# Patient Record
Sex: FEMALE | Race: WHITE | Employment: FULL TIME | URBAN - METROPOLITAN AREA
[De-identification: names, ages, dates, MRNs, and addresses within clinical notes are randomized per-mention and may not be internally consistent; named-entity substitution may affect disease eponyms.]

---

## 2017-01-17 PROBLEM — M79.7 FIBROMYALGIA MUSCLE PAIN: Status: ACTIVE | Noted: 2017-01-17

## 2017-01-17 PROBLEM — E78.5 HYPERLIPIDEMIA: Status: ACTIVE | Noted: 2017-01-17

## 2017-01-24 ENCOUNTER — HOSPITAL ENCOUNTER (OUTPATIENT)
Dept: MRI IMAGING | Age: 47
Discharge: HOME | End: 2017-01-24
Attending: PODIATRIST
Payer: COMMERCIAL

## 2017-01-24 DIAGNOSIS — M25.571 RIGHT ANKLE PAIN: ICD-10-CM

## 2017-01-24 PROCEDURE — 73721 MRI JNT OF LWR EXTRE W/O DYE: CPT

## 2017-02-15 ENCOUNTER — HOSPITAL ENCOUNTER (OUTPATIENT)
Dept: PHYSICAL THERAPY | Age: 47
Discharge: HOME | End: 2017-02-15
Payer: COMMERCIAL

## 2017-02-15 PROCEDURE — 97140 MANUAL THERAPY 1/> REGIONS: CPT

## 2017-02-15 PROCEDURE — 97110 THERAPEUTIC EXERCISES: CPT

## 2017-02-15 PROCEDURE — 97162 PT EVAL MOD COMPLEX 30 MIN: CPT

## 2017-02-15 NOTE — PROGRESS NOTES
Ambulatory/Rehab Services H2 Model Falls Risk Assessment    Risk Factor Pts. ·   Confusion/Disorientation/Impulsivity  []    4 ·   Symptomatic Depression  []   2 ·   Altered Elimination  []   1 ·   Dizziness/Vertigo  []   1 ·   Gender (Male)  []   1 ·   Any administered antiepileptics (anticonvulsants):  []   2 ·   Any administered benzodiazepines:  []   1 ·   Visual Impairment (specify):  []   1 ·   Portable Oxygen Use  []   1 ·   Orthostatic ? BP  []   1 ·   History of Recent Falls (within 3 mos.)  []   5     Ability to Rise from Chair (choose one) Pts. ·   Ability to rise in a single movement  []   0 ·   Pushes up, successful in one attempt  [x]   1 ·   Multiple attempts, but successful  []   3 ·   Unable to rise without assistance  []   4   Total: (5 or greater = High Risk) 1     Falls Prevention Plan:   []                Physical Limitations to Exercise (specify):   []                Mobility Assistance Device (type):   []                Exercise/Equipment Adaptation (specify):    ©2010 [facility] of [address] Patent #[id number].  Federal Law prohibits the replication, distribution or use without written permission from [facility] "[employer]"

## 2017-02-15 NOTE — PROGRESS NOTES
Kerry Kendall  : 1970 [facility] at [address], [address] [place] [address]  [phone]   [phone]         OUTPATIENT PHYSICAL THERAPY:Initial Assessment 2/15/2017    ICD-10: Treatment Diagnosis: M25.571 pain in joint/ankle /joints of foot and R26.89 other abnormalities of gait and mobility  Precautions/Allergies:   Iodine-excessive weight bearing   Fall Risk Score: 1 (? 5 = High Risk)  MD Orders: evaluate and treat MEDICAL/REFERRING DIAGNOSIS:  Right ankle pain [M25.571]   DATE OF ONSET: 11-10-16  REFERRING PHYSICIAN: Raymond Ramirez DPM  RETURN PHYSICIAN APPOINTMENT: 17     INITIAL ASSESSMENT:  Ms. 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 Decreased Strength  2. Decreased ADL/Functional Activities  3. Decreased Transfer Abilities  4. Decreased Ambulation Ability/Technique  5. Decreased Balance  6. Increased Pain  7. Decreased Activity Tolerance  8. Decreased Pacing Skills INTERVENTIONS PLANNED:  1. Cryotherapy  2.  Gait Training  3. Heat  4. Home Exercise Program (HEP)  5. Manual Therapy  6. Therapeutic Exercise/Strengthening   7. vasopneumatic compression with cold   TREATMENT PLAN:  Effective Dates: 2-15-17 to 3-30-17. Frequency/Duration: 2 times a week for 6 weeks  GOALS: (Goals have been discussed and agreed upon with patient.)  Short-Term Functional Goals: Time Frame: 3-1-17  1. Lateral R ankle pain is less than 2/10 at rest and less than 7/10 after working all day to progress to DC goal   Instruction in exercise program to allow increased ADLs. Discharge Goals: Time Frame:3-30-17  1. No ankle pain at rest and 2-3/10 after working/standing for 8 hours to allow her to perform full home ADLs and most job duties   2. Full /painfree ankle mobility to allow ease with putting on shoes and socks and in and out of car and driving/hitting gas and brake pedal without any problem/pain  3. Independent ambulation with no antalgic gait to allow walking community distances  4. Able to go up and down curbs with only minimal issue   5. FAAM score is improved from 34/116 to 55/116  Rehabilitation Potential For Stated Goals: Good  Regarding Reji Ziegler's therapy, I certify that the treatment plan above will be carried out by a therapist or under their direction. Thank you for this referral,  Anurag Tobin PT     Referring Physician Signature: Wilma Chakraborty DPM              Date                    The information in this section was collected on 2-15-17 (except where otherwise noted). HISTORY:   History of Present Injury/Illness (Reason for Referral):  R inversion ankle sprain on 11-10-16 while jumping/playing with granddaughter  Past Medical History/Comorbidities:   Ms. 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   Ms. Samuel Fuentes  has a past surgical history that includes abdomen surgery proc unlisted; partial hysterectomy (2007); and total abdominal hysterectomy. Social History/Living Environment:   Home Environment: Private residence  # Steps to Enter: 0  Rails to Enter: Yes  Wheelchair Ramp: Yes  One/Two Story Residence: One story  Living Alone: No  Support Systems: Family member(s)  Patient Expects to be Discharged to[de-identified] Private residence  Current DME Used/Available at Home: Brace/Splint  Tub or Shower Type: Tub/Shower combination  Prior Level of Function/Work/Activity:  Independent with home ADLs and job duties and walking with no antalgic gait     Current Medications:       Current Outpatient Prescriptions:     Naproxen Sodium 500 mg TM24, 500 mg daily. , Disp: , Rfl:     ibuprofen 200 mg cap, 200 mg. 2 oral every 6 hours, Disp: , Rfl:     traMADol (ULTRAM) 50 mg tablet, Take 1 Tab by mouth every six (6) hours as needed for Pain., Disp: 20 Tab, Rfl: 0   Date Last Reviewed: 2-15-17   Number of Personal Factors/Comorbidities that affect the Plan of Care-patient has a history of lumbar/arthritis issues and fibromyalgia and is raising her grandchild alone and may have difficulty finding time to come to therapy  : 1-2: MODERATE COMPLEXITY   EXAMINATION:   Palpation:          Tender at R anterior talo fibular ligament and distal peroneal tendon with tight gastrosoleus  ROM:         Ankle range is wfl but guarded on the R -no swelling noted  Strength:          wfl except R ankle evertors are 3 to 3+/5 but with pain inhibition  Special Tests:         Negative homans sign -good pedal pulses-skin is warm not hot   Balance: Moderate antalgic gait with walking independently--attempted walking with cane but patient will need practice with this   Mental Status:         Alert and oriented and patient wants to take some time off from work to get this ankle well   Sensation:       Intact to light touch    Body Structures Involved:  1. Bones  2. Joints  3. Muscles  4.  Ligaments Body Functions Affected:  1. Sensory/Pain  2. Neuromusculoskeletal  3. Movement Related Activities and Participation Affected:  1. Learning and Applying Knowledge  2. General Tasks and Demands  3. Mobility  4. Self Care  5. Community, Social and [place]   Number of elements (examined above) that affect the Plan of Care: 3: MODERATE COMPLEXITY   CLINICAL PRESENTATION:   Presentation: Evolving clinical presentation with changing clinical characteristics: MODERATE COMPLEXITY   CLINICAL DECISION MAKING:   Outcome Measure: Tool Used: PT/OT FOOT AND ANKLE ABILITY MEASURE  Score:  Initial: 34/116 Most Recent: X (Date: -- )   Interpretation of Score: For the \"Activities of Daily Living\", there are 21 questions each scored on a 5 point scale with 0 representing \"Unable to do\" and 4 representing \"No difficulty\". The lower the score, the greater the functional disability. 84/84 represents no disability. Minimal detectable change is 5.7 points. With the addition of the 8 questions in the \"Sports Subscale,\" there are 29 questions, each scored on a 5 point scale with 0 representing \"Unable to do\" and 4 representing \"No difficulty\". The lower the score, the greater the functional disability. 116/116 represents no disability. Minimal detectable change is 12.3 points. Activities of Daily Living:  Score 84 83-68 67-51 50-34 33-18 17-1 0   Modifier CH CI CJ CK CL CM CN     Activities of Daily Living + Sports Subscale:  Score 116 115-94 93-71 70-47 46-24 23-1 0   Modifier CH CI CJ CK CL CM CN         Medical Necessity:   · Patient is expected to demonstrate progress in strength, range of motion, balance, coordination and functional technique to increase independence with ADLs and improve safety during walking and transfers. · Skilled intervention continues to be required due to R ankle pain/sprain is affecting function and gait.   Reason for Services/Other Comments:  · Patient continues to require modification of therapeutic interventions to increase complexity of exercises. Use of outcome tool(s) and clinical judgement create a POC that gives a: Questionable prediction of patient's progress: MODERATE COMPLEXITY            TREATMENT:   (In addition to Assessment/Re-Assessment sessions the following treatments were rendered)  Pre-treatment Symptoms/Complaints:  I have a problem taking antiinflammatories as they upset my stomach and I have not been taking my tramadol nor using ice much at all , just using my ankle brace but that feels loose on my ankle now  -I am thinking about taking some time off from work to get this ankle better as my job makes my pain worse at the end of each day -advised to use ice and elevate ankle and to push evertor strengthening   Pain: Initial:   Pain Intensity 1: 2  Pain Location 1: Ankle  Pain Orientation 1: Lateral, Right  Pain Intervention(s) 1: Elevation, Ice, Rest  Post Session:  1/10 but still walking with moderate antalgic gait -performed exercises well and tolerated cryotherapy well -needs work with gait training with cane      THERAPEUTIC EXERCISE: (15 minutes): Instructed in use of ice and elevation and exercises and ankle sleeve/support and use of cane     Exercises per grid below to improve mobility, strength, balance and coordination. Required minimal verbal cues to promote proper body alignment and promote proper body posture. Progressed resistance, range, repetitions and complexity of movement as indicated.    Date:  2-15-17 Date:   Date:     Activity/Exercise Parameters Parameters Parameters   gastrocsoleus stretch  X 5 minutes by therapist -done between each set of evertor work     Ankle eversion 5 x 15 with red band                                      Manual therapy -soft tissue mobilization x 15 minutes to lateral R ankle and peroneal musculature and plantar fascial area while supine -cross friction as tolerated     vasopneumatic compression with cold x 15 minutes while supine following exercises     NO CHARGE FOR VASOPNEUMATIC COMPRESSION     Treatment/Session Assessment:    · Response to Treatment:  Performed exercises well -tolerated cryotherapy well -needed cues with exercises and gait training with cane. · Compliance with Program/Exercises: compliant most of the time. · Recommendations/Intent for next treatment session: \"Next visit will focus on advancements to more challenging activities\". push gastrocsoleus stretching and ankle evertor conditioning followed with ice -ankle support while up on job/hard floors-if patient is going to take time off from work she needs to use it to get better and not catch up with household chores and being up all the time caring for her granddaughter  ·   Total Treatment Duration:manual therapy x 15 minutes/exercise x 15 minutes/vasopneumatic compression x 15 minutes -total treatment time was 45 minutes    Time in - 9:45  Time out-10:50       Faisal Kelly, PT

## 2017-02-20 ENCOUNTER — HOSPITAL ENCOUNTER (OUTPATIENT)
Dept: PHYSICAL THERAPY | Age: 47
Discharge: HOME | End: 2017-02-20
Payer: COMMERCIAL

## 2017-02-20 PROCEDURE — 97110 THERAPEUTIC EXERCISES: CPT

## 2017-02-20 PROCEDURE — 97140 MANUAL THERAPY 1/> REGIONS: CPT

## 2017-02-20 NOTE — PROGRESS NOTES
Maribell Hsu  : 1970 [facility] at [facility], Jeremiah adams, [address]  [phone]   [phone]         OUTPATIENT PHYSICAL THERAPY:Daily Note 2017    ICD-10: Treatment Diagnosis: M25.571 pain in joint/ankle /joints of foot and R26.89 other abnormalities of gait and mobility  Precautions/Allergies:   Iodine-excessive weight bearing   Fall Risk Score: 1 (? 5 = High Risk)  MD Orders: evaluate and treat MEDICAL/REFERRING DIAGNOSIS:  Right ankle pain [M25.571]   DATE OF ONSET: 11-10-16  REFERRING PHYSICIAN: Rafael Ramos DPM  RETURN PHYSICIAN APPOINTMENT: 17     INITIAL ASSESSMENT:  Ms. 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 Decreased Strength  2. Decreased ADL/Functional Activities  3. Decreased Transfer Abilities  4. Decreased Ambulation Ability/Technique  5. Decreased Balance  6. Increased Pain  7. Decreased Activity Tolerance  8. Decreased Pacing Skills INTERVENTIONS PLANNED:  1. Cryotherapy  2.  Gait Training  3. Heat  4. Home Exercise Program (HEP)  5. Manual Therapy  6. Therapeutic Exercise/Strengthening   7. vasopneumatic compression with cold   TREATMENT PLAN:  Effective Dates: 2-15-17 to 3-30-17. Frequency/Duration: 2 times a week for 6 weeks  GOALS: (Goals have been discussed and agreed upon with patient.)  Short-Term Functional Goals: Time Frame: 3-1-17  1. Lateral R ankle pain is less than 2/10 at rest and less than 7/10 after working all day to progress to DC goal   Instruction in exercise program to allow increased ADLs. Discharge Goals: Time Frame:3-30-17  1. No ankle pain at rest and 2-3/10 after working/standing for 8 hours to allow her to perform full home ADLs and most job duties   2. Full /painfree ankle mobility to allow ease with putting on shoes and socks and in and out of car and driving/hitting gas and brake pedal without any problem/pain  3. Independent ambulation with no antalgic gait to allow walking community distances  4. Able to go up and down curbs with only minimal issue   5. FAAM score is improved from 34/116 to 55/116  Rehabilitation Potential For Stated Goals: Good  Regarding Sunny Ziegler's therapy, I certify that the treatment plan above will be carried out by a therapist or under their direction. Thank you for this referral,  Natan Smith PTA     Referring Physician Signature: Kvng Chakraborty DPM              Date                    The information in this section was collected on 2-15-17 (except where otherwise noted). HISTORY:   History of Present Injury/Illness (Reason for Referral):  R inversion ankle sprain on 11-10-16 while jumping/playing with granddaughter  Past Medical History/Comorbidities:   Ms. 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   Ms. George Washington  has a past surgical history that includes abdomen surgery proc unlisted; partial hysterectomy (2007); and total abdominal hysterectomy. Social History/Living Environment:      Prior Level of Function/Work/Activity:  Independent with home ADLs and job duties and walking with no antalgic gait     Current Medications:       Current Outpatient Prescriptions:     Naproxen Sodium 500 mg TM24, 500 mg daily. , Disp: , Rfl:     ibuprofen 200 mg cap, 200 mg. 2 oral every 6 hours, Disp: , Rfl:     traMADol (ULTRAM) 50 mg tablet, Take 1 Tab by mouth every six (6) hours as needed for Pain., Disp: 20 Tab, Rfl: 0   Date Last Reviewed: 2-15-17   Number of Personal Factors/Comorbidities that affect the Plan of Care-patient has a history of lumbar/arthritis issues and fibromyalgia and is raising her grandchild alone and may have difficulty finding time to come to therapy  : 1-2: MODERATE COMPLEXITY   EXAMINATION:   Palpation:          Tender at R anterior talo fibular ligament and distal peroneal tendon with tight gastrosoleus  ROM:         Ankle range is wfl but guarded on the R -no swelling noted  Strength:          wfl except R ankle evertors are 3 to 3+/5 but with pain inhibition  Special Tests:         Negative homans sign -good pedal pulses-skin is warm not hot   Balance: Moderate antalgic gait with walking independently--attempted walking with cane but patient will need practice with this   Mental Status:         Alert and oriented and patient wants to take some time off from work to get this ankle well   Sensation:       Intact to light touch    Body Structures Involved:  1. Bones  2. Joints  3. Muscles  4. Ligaments Body Functions Affected:  1. Sensory/Pain  2. Neuromusculoskeletal  3. Movement Related Activities and Participation Affected:  1. Learning and Applying Knowledge  2. General Tasks and Demands  3. Mobility  4. Self Care  5.  Community, Social and [place]   Number of elements (examined above) that affect the Plan of Care: 3: MODERATE COMPLEXITY CLINICAL PRESENTATION:   Presentation: Evolving clinical presentation with changing clinical characteristics: MODERATE COMPLEXITY   CLINICAL DECISION MAKING:   Outcome Measure: Tool Used: PT/OT FOOT AND ANKLE ABILITY MEASURE  Score:  Initial: 34/116 Most Recent: X (Date: -- )   Interpretation of Score: For the \"Activities of Daily Living\", there are 21 questions each scored on a 5 point scale with 0 representing \"Unable to do\" and 4 representing \"No difficulty\". The lower the score, the greater the functional disability. 84/84 represents no disability. Minimal detectable change is 5.7 points. With the addition of the 8 questions in the \"Sports Subscale,\" there are 29 questions, each scored on a 5 point scale with 0 representing \"Unable to do\" and 4 representing \"No difficulty\". The lower the score, the greater the functional disability. 116/116 represents no disability. Minimal detectable change is 12.3 points. Activities of Daily Living:  Score 84 83-68 67-51 50-34 33-18 17-1 0   Modifier CH CI CJ CK CL CM CN     Activities of Daily Living + Sports Subscale:  Score 116 115-94 93-71 70-47 46-24 23-1 0   Modifier CH CI CJ CK CL CM CN         Medical Necessity:   · Patient is expected to demonstrate progress in strength, range of motion, balance, coordination and functional technique to increase independence with ADLs and improve safety during walking and transfers. · Skilled intervention continues to be required due to R ankle pain/sprain is affecting function and gait. Reason for Services/Other Comments:  · Patient continues to require modification of therapeutic interventions to increase complexity of exercises.    Use of outcome tool(s) and clinical judgement create a POC that gives a: Questionable prediction of patient's progress: MODERATE COMPLEXITY            TREATMENT:   (In addition to Assessment/Re-Assessment sessions the following treatments were rendered)  Pre-treatment Symptoms/Complaints: Tenderness at right lateral ankle and increased pain with prolonged standing and increased activities. Pain: Initial:   Pain Intensity 1: 2  Pain Location 1: Ankle  Pain Orientation 1: Lateral, Right  Post Session:  1/10 decreased antalgic gait after session     THERAPEUTIC EXERCISE: (25 minutes): Instructed in use of ice and elevation and exercises and ankle sleeve/support and use of cane     Exercises per grid below to improve mobility, strength, balance and coordination. Required minimal verbal cues to promote proper body alignment and promote proper body posture. Progressed resistance, range, repetitions and complexity of movement as indicated. Date:  2-15-17 Date:  2-20-17 Date:     Activity/Exercise Parameters Parameters Parameters   gastrocsoleus stretch  X 5 minutes by therapist -done between each set of evertor work Strap x 5    Ankle eversion 5 x 15 with red band  Green band 3 x 10    Ankle inversion  Green band 3 x 10    dorsiflexion  Green band 3 x 10    Plantar flexion  Green band 3 x 10    Mini squats  X 5 10 sec hold    Weight shifts  X 5 10 sec hold                        Manual therapy -soft tissue mobilization x 15 minutes to lateral R ankle , peroneal musculature and plantar fascia area while supine -         Treatment/Session Assessment:    · Response to Treatment: decreased pain after session. · Compliance with Program/Exercises: compliant most of the time. · Recommendations/Intent for next treatment session: \"Next visit will focus on advancements to more challenging activities\". push gastrocsoleus stretching and ankle evertor conditioning followed with ice -ankle support while up on job/hard floors-if patient is going to take time off from work she needs to use it to get better and not catch up with household chores and being up all the time caring for her granddaughter  ·   Total Treatment Duration:50 minutes      PT Patient Time In/Time Out  Time In: 0955  Time Out: [address] [facility] Jsamin Montez, PTA

## 2017-02-24 ENCOUNTER — HOSPITAL ENCOUNTER (OUTPATIENT)
Dept: PHYSICAL THERAPY | Age: 47
Discharge: HOME | End: 2017-02-24
Payer: COMMERCIAL

## 2017-02-24 PROCEDURE — 97140 MANUAL THERAPY 1/> REGIONS: CPT

## 2017-02-24 PROCEDURE — 97110 THERAPEUTIC EXERCISES: CPT

## 2017-02-24 NOTE — PROGRESS NOTES
Bay Kim  : 1970 [facility] [address], [address] [place] [address]  [phone]   [phone]         OUTPATIENT PHYSICAL THERAPY:Daily Note 2017    ICD-10: Treatment Diagnosis: M25.571 pain in joint/ankle /joints of foot and R26.89 other abnormalities of gait and mobility  Precautions/Allergies:   Iodine-excessive weight bearing   Fall Risk Score: 1 (? 5 = High Risk)  MD Orders: evaluate and treat MEDICAL/REFERRING DIAGNOSIS:  Right ankle pain [M25.571]   DATE OF ONSET: 11-10-16  REFERRING PHYSICIAN: Francetta Cowden, DPM  RETURN PHYSICIAN APPOINTMENT: 17     INITIAL ASSESSMENT:  Ms. 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 Decreased Strength  2. Decreased ADL/Functional Activities  3. Decreased Transfer Abilities  4. Decreased Ambulation Ability/Technique  5. Decreased Balance  6. Increased Pain  7. Decreased Activity Tolerance  8. Decreased Pacing Skills INTERVENTIONS PLANNED:  1. Cryotherapy  2.  Gait Training  3. Heat  4. Home Exercise Program (HEP)  5. Manual Therapy  6. Therapeutic Exercise/Strengthening   7. vasopneumatic compression with cold   TREATMENT PLAN:  Effective Dates: 2-15-17 to 3-30-17. Frequency/Duration: 2 times a week for 6 weeks  GOALS: (Goals have been discussed and agreed upon with patient.)  Short-Term Functional Goals: Time Frame: 3-1-17  1. Lateral R ankle pain is less than 2/10 at rest and less than 7/10 after working all day to progress to DC goal   Instruction in exercise program to allow increased ADLs. Discharge Goals: Time Frame:3-30-17  1. No ankle pain at rest and 2-3/10 after working/standing for 8 hours to allow her to perform full home ADLs and most job duties   2. Full /painfree ankle mobility to allow ease with putting on shoes and socks and in and out of car and driving/hitting gas and brake pedal without any problem/pain  3. Independent ambulation with no antalgic gait to allow walking community distances  4. Able to go up and down curbs with only minimal issue   5. FAAM score is improved from 34/116 to 55/116  Rehabilitation Potential For Stated Goals: Good  Regarding Elliott Ziegler's therapy, I certify that the treatment plan above will be carried out by a therapist or under their direction. Thank you for this referral,  Luana Luong PTA     Referring Physician Signature: Navya Chakraborty DPM              Date                    The information in this section was collected on 2-15-17 (except where otherwise noted). HISTORY:   History of Present Injury/Illness (Reason for Referral):  R inversion ankle sprain on 11-10-16 while jumping/playing with granddaughter  Past Medical History/Comorbidities:   Ms. 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   Ms. Cornell Jarrell  has a past surgical history that includes abdomen surgery proc unlisted; partial hysterectomy (2007); and total abdominal hysterectomy. Social History/Living Environment:      Prior Level of Function/Work/Activity:  Independent with home ADLs and job duties and walking with no antalgic gait     Current Medications:       Current Outpatient Prescriptions:     Naproxen Sodium 500 mg TM24, 500 mg daily. , Disp: , Rfl:     ibuprofen 200 mg cap, 200 mg. 2 oral every 6 hours, Disp: , Rfl:     traMADol (ULTRAM) 50 mg tablet, Take 1 Tab by mouth every six (6) hours as needed for Pain., Disp: 20 Tab, Rfl: 0   Date Last Reviewed: 2-24-17   Number of Personal Factors/Comorbidities that affect the Plan of Care-patient has a history of lumbar/arthritis issues and fibromyalgia and is raising her grandchild alone and may have difficulty finding time to come to therapy  : 1-2: MODERATE COMPLEXITY   EXAMINATION:   Palpation:          Tender at R anterior talo fibular ligament and distal peroneal tendon with tight gastrosoleus  ROM:         Ankle range is wfl but guarded on the R -no swelling noted  Strength:          wfl except R ankle evertors are 3 to 3+/5 but with pain inhibition  Special Tests:         Negative homans sign -good pedal pulses-skin is warm not hot   Balance: Moderate antalgic gait with walking independently--attempted walking with cane but patient will need practice with this   Mental Status:         Alert and oriented and patient wants to take some time off from work to get this ankle well   Sensation:       Intact to light touch    Body Structures Involved:  1. Bones  2. Joints  3. Muscles  4. Ligaments Body Functions Affected:  1. Sensory/Pain  2. Neuromusculoskeletal  3. Movement Related Activities and Participation Affected:  1. Learning and Applying Knowledge  2. General Tasks and Demands  3. Mobility  4. Self Care  5.  Community, Social and [place]   Number of elements (examined above) that affect the Plan of Care: 3: MODERATE COMPLEXITY CLINICAL PRESENTATION:   Presentation: Evolving clinical presentation with changing clinical characteristics: MODERATE COMPLEXITY   CLINICAL DECISION MAKING:   Outcome Measure: Tool Used: PT/OT FOOT AND ANKLE ABILITY MEASURE  Score:  Initial: 34/116 Most Recent: X (Date: -- )   Interpretation of Score: For the \"Activities of Daily Living\", there are 21 questions each scored on a 5 point scale with 0 representing \"Unable to do\" and 4 representing \"No difficulty\". The lower the score, the greater the functional disability. 84/84 represents no disability. Minimal detectable change is 5.7 points. With the addition of the 8 questions in the \"Sports Subscale,\" there are 29 questions, each scored on a 5 point scale with 0 representing \"Unable to do\" and 4 representing \"No difficulty\". The lower the score, the greater the functional disability. 116/116 represents no disability. Minimal detectable change is 12.3 points. Activities of Daily Living:  Score 84 83-68 67-51 50-34 33-18 17-1 0   Modifier CH CI CJ CK CL CM CN     Activities of Daily Living + Sports Subscale:  Score 116 115-94 93-71 70-47 46-24 23-1 0   Modifier CH CI CJ CK CL CM CN         Medical Necessity:   · Patient is expected to demonstrate progress in strength, range of motion, balance, coordination and functional technique to increase independence with ADLs and improve safety during walking and transfers. · Skilled intervention continues to be required due to R ankle pain/sprain is affecting function and gait. Reason for Services/Other Comments:  · Patient continues to require modification of therapeutic interventions to increase complexity of exercises.    Use of outcome tool(s) and clinical judgement create a POC that gives a: Questionable prediction of patient's progress: MODERATE COMPLEXITY            TREATMENT:   (In addition to Assessment/Re-Assessment sessions the following treatments were rendered)  Pre-treatment Symptoms/Complaints: Reports over all improvement since injury but continues to report increased right ankle pain with prolonged standing and increased activities including being on her feet a lot at work. Pain: Initial:   Pain Intensity 1: 2  Pain Location 1: Ankle  Pain Orientation 1: Right, Lateral  Post Session:  1/10 decreased antalgic gait after session     THERAPEUTIC EXERCISE: (25 minutes): Instructed in use of ice and elevation and exercises and ankle sleeve/support and use of cane     Exercises per grid below to improve mobility, strength, balance and coordination. Required minimal verbal cues to promote proper body alignment and promote proper body posture. Progressed resistance, range, repetitions and complexity of movement as indicated. Date:  2-15-17 Date:  2-20-17 Date:  2-24-17   Activity/Exercise Parameters Parameters Parameters   gastrocsoleus stretch  X 5 minutes by therapist -done between each set of evertor work Strap x 5 Incline 3 x 30 sec   Ankle eversion 5 x 15 with red band  Green band 3 x 10 Green band 2 x 15   Ankle inversion  Green band 3 x 10 Green band 2 x 15   dorsiflexion  Green band 3 x 10 Green band 2 x 15   Plantar flexion  Green band 3 x 10 Green band 1 x 15   Mini squats  X 5 10 sec hold    Weight shifts  X 5 10 sec hold    NU step   5 minutes level 3   Single leg stance   6 x 10 sec   Heel raises   3 x 10                 Manual therapy -soft tissue mobilization x 15 minutes to lateral R ankle , peroneal musculature and plantar fascia area while supine -         Treatment/Session Assessment:    · Response to Treatment: decreased pain after session. Improved gait after treatment  · Compliance with Program/Exercises: compliant most of the time. · Recommendations/Intent for next treatment session: \"Next visit will focus on advancements to more challenging activities\"progress weight bearing exercises as tolerated.  Modalities as needed  ·   Total Treatment Duration:40 minutes      PT Patient Time In/Time Out  Time In: 1440  Time Out: 1530    Leonor Found, PTA

## 2017-02-27 ENCOUNTER — HOSPITAL ENCOUNTER (OUTPATIENT)
Dept: PHYSICAL THERAPY | Age: 47
Discharge: HOME | End: 2017-02-27
Payer: COMMERCIAL

## 2017-02-27 PROCEDURE — 97110 THERAPEUTIC EXERCISES: CPT

## 2017-02-27 NOTE — PROGRESS NOTES
Danielle Rodriguez  : 1970 [facility] [place] [address], Jeremiah adams, [address] [facility]  [phone]   [phone]         OUTPATIENT PHYSICAL THERAPY:Daily Note 2017    ICD-10: Treatment Diagnosis: M25.571 pain in joint/ankle /joints of foot and R26.89 other abnormalities of gait and mobility  Precautions/Allergies:   Iodine-excessive weight bearing   Fall Risk Score: 1 (? 5 = High Risk)  MD Orders: evaluate and treat MEDICAL/REFERRING DIAGNOSIS:  Right ankle pain [M25.571]   DATE OF ONSET: 11-10-16  REFERRING PHYSICIAN: Cory Kimbrough DPM  RETURN PHYSICIAN APPOINTMENT: 17     INITIAL ASSESSMENT:  Ms. 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 Decreased Strength  2. Decreased ADL/Functional Activities  3. Decreased Transfer Abilities  4. Decreased Ambulation Ability/Technique  5. Decreased Balance  6. Increased Pain  7. Decreased Activity Tolerance  8. Decreased Pacing Skills INTERVENTIONS PLANNED:  1. Cryotherapy  2.  Gait Training  3. Heat  4. Home Exercise Program (HEP)  5. Manual Therapy  6. Therapeutic Exercise/Strengthening   7. vasopneumatic compression with cold   TREATMENT PLAN:  Effective Dates: 2-15-17 to 3-30-17. Frequency/Duration: 2 times a week for 6 weeks  GOALS: (Goals have been discussed and agreed upon with patient.)  Short-Term Functional Goals: Time Frame: 3-1-17  1. Lateral R ankle pain is less than 2/10 at rest and less than 7/10 after working all day to progress to DC goal   Instruction in exercise program to allow increased ADLs. Discharge Goals: Time Frame:3-30-17  1. No ankle pain at rest and 2-3/10 after working/standing for 8 hours to allow her to perform full home ADLs and most job duties   2. Full /painfree ankle mobility to allow ease with putting on shoes and socks and in and out of car and driving/hitting gas and brake pedal without any problem/pain  3. Independent ambulation with no antalgic gait to allow walking community distances  4. Able to go up and down curbs with only minimal issue   5. FAAM score is improved from 34/116 to 55/116  Rehabilitation Potential For Stated Goals: Good  Regarding Orthomasrenee Ziegler's therapy, I certify that the treatment plan above will be carried out by a therapist or under their direction. Thank you for this referral,  Ursula Cook PTA     Referring Physician Signature: James Chakraborty DPM              Date                    The information in this section was collected on 2-15-17 (except where otherwise noted). HISTORY:   History of Present Injury/Illness (Reason for Referral):  R inversion ankle sprain on 11-10-16 while jumping/playing with granddaughter  Past Medical History/Comorbidities:   Ms. 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   Ms. Phoebe Saldaña  has a past surgical history that includes abdomen surgery proc unlisted; partial hysterectomy (2007); and total abdominal hysterectomy. Social History/Living Environment:      Prior Level of Function/Work/Activity:  Independent with home ADLs and job duties and walking with no antalgic gait     Current Medications:       Current Outpatient Prescriptions:     Naproxen Sodium 500 mg TM24, 500 mg daily. , Disp: , Rfl:     ibuprofen 200 mg cap, 200 mg. 2 oral every 6 hours, Disp: , Rfl:     traMADol (ULTRAM) 50 mg tablet, Take 1 Tab by mouth every six (6) hours as needed for Pain., Disp: 20 Tab, Rfl: 0   Date Last Reviewed: 2-24-17   Number of Personal Factors/Comorbidities that affect the Plan of Care-patient has a history of lumbar/arthritis issues and fibromyalgia and is raising her grandchild alone and may have difficulty finding time to come to therapy  : 1-2: MODERATE COMPLEXITY   EXAMINATION:   Palpation:          Tender at R anterior talo fibular ligament and distal peroneal tendon with tight gastrosoleus  ROM:         Ankle range is wfl but guarded on the R -no swelling noted  Strength:          wfl except R ankle evertors are 3 to 3+/5 but with pain inhibition  Special Tests:         Negative homans sign -good pedal pulses-skin is warm not hot   Balance: Moderate antalgic gait with walking independently--attempted walking with cane but patient will need practice with this   Mental Status:         Alert and oriented and patient wants to take some time off from work to get this ankle well   Sensation:       Intact to light touch    Body Structures Involved:  1. Bones  2. Joints  3. Muscles  4. Ligaments Body Functions Affected:  1. Sensory/Pain  2. Neuromusculoskeletal  3. Movement Related Activities and Participation Affected:  1. Learning and Applying Knowledge  2. General Tasks and Demands  3. Mobility  4. Self Care  5.  Community, Social and [place]   Number of elements (examined above) that affect the Plan of Care: 3: MODERATE COMPLEXITY CLINICAL PRESENTATION:   Presentation: Evolving clinical presentation with changing clinical characteristics: MODERATE COMPLEXITY   CLINICAL DECISION MAKING:   Outcome Measure: Tool Used: PT/OT FOOT AND ANKLE ABILITY MEASURE  Score:  Initial: 34/116 Most Recent: X (Date: -- )   Interpretation of Score: For the \"Activities of Daily Living\", there are 21 questions each scored on a 5 point scale with 0 representing \"Unable to do\" and 4 representing \"No difficulty\". The lower the score, the greater the functional disability. 84/84 represents no disability. Minimal detectable change is 5.7 points. With the addition of the 8 questions in the \"Sports Subscale,\" there are 29 questions, each scored on a 5 point scale with 0 representing \"Unable to do\" and 4 representing \"No difficulty\". The lower the score, the greater the functional disability. 116/116 represents no disability. Minimal detectable change is 12.3 points. Activities of Daily Living:  Score 84 83-68 67-51 50-34 33-18 17-1 0   Modifier CH CI CJ CK CL CM CN     Activities of Daily Living + Sports Subscale:  Score 116 115-94 93-71 70-47 46-24 23-1 0   Modifier CH CI CJ CK CL CM CN         Medical Necessity:   · Patient is expected to demonstrate progress in strength, range of motion, balance, coordination and functional technique to increase independence with ADLs and improve safety during walking and transfers. · Skilled intervention continues to be required due to R ankle pain/sprain is affecting function and gait. Reason for Services/Other Comments:  · Patient continues to require modification of therapeutic interventions to increase complexity of exercises.    Use of outcome tool(s) and clinical judgement create a POC that gives a: Questionable prediction of patient's progress: MODERATE COMPLEXITY            TREATMENT:   (In addition to Assessment/Re-Assessment sessions the following treatments were rendered)  Pre-treatment Symptoms/Complaints: Minimal pain today. Pain increases with prolonged standing and increased walking. Pain: Initial:   Pain Intensity 1: 1  Pain Location 1: Ankle  Pain Orientation 1: Lateral, Right  Post Session:  1/10 decreased antalgic gait after session     THERAPEUTIC EXERCISE: (45 minutes): Instructed in use of ice and elevation and exercises and ankle sleeve/support and use of cane     Exercises per grid below to improve mobility, strength, balance and coordination. Required minimal verbal cues to promote proper body alignment and promote proper body posture. Progressed resistance, range, repetitions and complexity of movement as indicated. Date:  2-27-17 Date:  2-20-17 Date:  2-24-17   Activity/Exercise Parameters Parameters Parameters   gastrocsoleus stretch  Strap x 3  Incline 4 x 30 sec Strap x 5 Incline 3 x 30 sec   Ankle eversion Green 3 x 10 Green band 3 x 10 Green band 2 x 15   Ankle inversion Green 3 x 10 Green band 3 x 10 Green band 2 x 15   dorsiflexion Green 3 x 10 Green band 3 x 10 Green band 2 x 15   Plantar flexion Green 3 x 10 Green band 3 x 10 Green band 1 x 15   Mini squats  X 5 10 sec hold    Weight shifts  X 5 10 sec hold    NU step 6 minutes level 3  5 minutes level 3   Single leg stance 3 x 30 sec  6 x 10 sec   Heel raises 3 x 10  3 x 10   aridyne 5 minutes     elliptical 2 minutes forward 2 min backward                              Treatment/Session Assessment:    · Response to Treatment: decreased pain after session. Improved gait after treatment. Decreased antalgic gait  · Compliance with Program/Exercises: compliant most of the time. · Recommendations/Intent for next treatment session: \"Next visit will focus on advancements to more challenging activities\"progress weight bearing exercises as tolerated.  Modalities as needed  ·   Total Treatment Duration:45 minutes      PT Patient Time In/Time Out  Time In: 1000  Time Out: 440 [facility]

## 2017-03-01 ENCOUNTER — HOSPITAL ENCOUNTER (OUTPATIENT)
Dept: PHYSICAL THERAPY | Age: 47
Discharge: HOME | End: 2017-03-01
Payer: COMMERCIAL

## 2017-03-01 PROCEDURE — 97110 THERAPEUTIC EXERCISES: CPT

## 2017-03-01 NOTE — PROGRESS NOTES
Bay Kim  : 1970 [facility], [address], [address] [place] [address]  [phone]   [phone]         OUTPATIENT PHYSICAL THERAPY:Daily Note 3/1/2017    ICD-10: Treatment Diagnosis: M25.571 pain in joint/ankle /joints of foot and R26.89 other abnormalities of gait and mobility  Precautions/Allergies:   Iodine-excessive weight bearing   Fall Risk Score: 1 (? 5 = High Risk)  MD Orders: evaluate and treat MEDICAL/REFERRING DIAGNOSIS:  Right ankle pain [M25.571]   DATE OF ONSET: 11-10-16  REFERRING PHYSICIAN: Francetta Cowden, DPM  RETURN PHYSICIAN APPOINTMENT: 17     INITIAL ASSESSMENT:  Ms. 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 Decreased Strength  2. Decreased ADL/Functional Activities  3. Decreased Transfer Abilities  4. Decreased Ambulation Ability/Technique  5. Decreased Balance  6. Increased Pain  7. Decreased Activity Tolerance  8. Decreased Pacing Skills INTERVENTIONS PLANNED:  1. Cryotherapy  2.  Gait Training  3. Heat  4. Home Exercise Program (HEP)  5. Manual Therapy  6. Therapeutic Exercise/Strengthening   7. vasopneumatic compression with cold   TREATMENT PLAN:  Effective Dates: 2-15-17 to 3-30-17. Frequency/Duration: 2 times a week for 6 weeks  GOALS: (Goals have been discussed and agreed upon with patient.)  Short-Term Functional Goals: Time Frame: 3-1-17  1. Lateral R ankle pain is less than 2/10 at rest and less than 7/10 after working all day to progress to DC goal   Instruction in exercise program to allow increased ADLs. Discharge Goals: Time Frame:3-30-17  1. No ankle pain at rest and 2-3/10 after working/standing for 8 hours to allow her to perform full home ADLs and most job duties   2. Full /painfree ankle mobility to allow ease with putting on shoes and socks and in and out of car and driving/hitting gas and brake pedal without any problem/pain  3. Independent ambulation with no antalgic gait to allow walking community distances  4. Able to go up and down curbs with only minimal issue   5. FAAM score is improved from 34/116 to 55/116  Rehabilitation Potential For Stated Goals: Good  Regarding Huseyin Ziegler's therapy, I certify that the treatment plan above will be carried out by a therapist or under their direction. Thank you for this referral,  Vinson Snellen, PTA     Referring Physician Signature: Berto Chakraborty DPM              Date                    The information in this section was collected on 2-15-17 (except where otherwise noted). HISTORY:   History of Present Injury/Illness (Reason for Referral):  R inversion ankle sprain on 11-10-16 while jumping/playing with granddaughter  Past Medical History/Comorbidities:   Ms. 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   Ms. Princess Arias  has a past surgical history that includes abdomen surgery proc unlisted; partial hysterectomy (2007); and total abdominal hysterectomy. Social History/Living Environment:      Prior Level of Function/Work/Activity:  Independent with home ADLs and job duties and walking with no antalgic gait     Current Medications:       Current Outpatient Prescriptions:     Naproxen Sodium 500 mg TM24, 500 mg daily. , Disp: , Rfl:     ibuprofen 200 mg cap, 200 mg. 2 oral every 6 hours, Disp: , Rfl:     traMADol (ULTRAM) 50 mg tablet, Take 1 Tab by mouth every six (6) hours as needed for Pain., Disp: 20 Tab, Rfl: 0   Date Last Reviewed: 2-24-17   Number of Personal Factors/Comorbidities that affect the Plan of Care-patient has a history of lumbar/arthritis issues and fibromyalgia and is raising her grandchild alone and may have difficulty finding time to come to therapy  : 1-2: MODERATE COMPLEXITY   EXAMINATION:   Palpation:          Tender at R anterior talo fibular ligament and distal peroneal tendon with tight gastrosoleus  ROM:         Ankle range is wfl but guarded on the R -no swelling noted  Strength:          wfl except R ankle evertors are 3 to 3+/5 but with pain inhibition  Special Tests:         Negative homans sign -good pedal pulses-skin is warm not hot   Balance: Moderate antalgic gait with walking independently--attempted walking with cane but patient will need practice with this   Mental Status:         Alert and oriented and patient wants to take some time off from work to get this ankle well   Sensation:       Intact to light touch    Body Structures Involved:  1. Bones  2. Joints  3. Muscles  4. Ligaments Body Functions Affected:  1. Sensory/Pain  2. Neuromusculoskeletal  3. Movement Related Activities and Participation Affected:  1. Learning and Applying Knowledge  2. General Tasks and Demands  3. Mobility  4. Self Care  5.  Community, Social and [place]   Number of elements (examined above) that affect the Plan of Care: 3: MODERATE COMPLEXITY CLINICAL PRESENTATION:   Presentation: Evolving clinical presentation with changing clinical characteristics: MODERATE COMPLEXITY   CLINICAL DECISION MAKING:   Outcome Measure: Tool Used: PT/OT FOOT AND ANKLE ABILITY MEASURE  Score:  Initial: 34/116 Most Recent: X (Date: -- )   Interpretation of Score: For the \"Activities of Daily Living\", there are 21 questions each scored on a 5 point scale with 0 representing \"Unable to do\" and 4 representing \"No difficulty\". The lower the score, the greater the functional disability. 84/84 represents no disability. Minimal detectable change is 5.7 points. With the addition of the 8 questions in the \"Sports Subscale,\" there are 29 questions, each scored on a 5 point scale with 0 representing \"Unable to do\" and 4 representing \"No difficulty\". The lower the score, the greater the functional disability. 116/116 represents no disability. Minimal detectable change is 12.3 points. Activities of Daily Living:  Score 84 83-68 67-51 50-34 33-18 17-1 0   Modifier CH CI CJ CK CL CM CN     Activities of Daily Living + Sports Subscale:  Score 116 115-94 93-71 70-47 46-24 23-1 0   Modifier CH CI CJ CK CL CM CN         Medical Necessity:   · Patient is expected to demonstrate progress in strength, range of motion, balance, coordination and functional technique to increase independence with ADLs and improve safety during walking and transfers. · Skilled intervention continues to be required due to R ankle pain/sprain is affecting function and gait. Reason for Services/Other Comments:  · Patient continues to require modification of therapeutic interventions to increase complexity of exercises.    Use of outcome tool(s) and clinical judgement create a POC that gives a: Questionable prediction of patient's progress: MODERATE COMPLEXITY            TREATMENT:   (In addition to Assessment/Re-Assessment sessions the following treatments were rendered)  Pre-treatment Symptoms/Complaints: Minimal pain today. Pain increases with prolonged standing and increased walking. Pain: Initial:   Pain Intensity 1: 1  Pain Location 1: Ankle  Pain Orientation 1: Lateral, Right  Post Session:  1/10 decreased antalgic gait after session     THERAPEUTIC EXERCISE: (45 minutes): Instructed in use of ice and elevation and exercises and ankle sleeve/support and use of cane     Exercises per grid below to improve mobility, strength, balance and coordination. Required minimal verbal cues to promote proper body alignment and promote proper body posture. Progressed resistance, range, repetitions and complexity of movement as indicated. Date:  2-27-17 Date:  3-1-17 Date:  2-24-17   Activity/Exercise Parameters Parameters Parameters   gastrocsoleus stretch  Strap x 3  Incline 4 x 30 sec Incline 3 x 30 sec Incline 3 x 30 sec   Ankle eversion Green 3 x 10 Green band 3 x 10 Green band 2 x 15   Ankle inversion Green 3 x 10 Green band 3 x 10 Green band 2 x 15   dorsiflexion Green 3 x 10 Green band 3 x 10 Green band 2 x 15   Plantar flexion Green 3 x 10 Green band 3 x 10 Green band 1 x 15   Mini squats      Weight shifts      NU step 6 minutes level 3 10 minutes level 3 5 minutes level 3   Single leg stance 3 x 30 sec Green therapad 4 x 15 sec 6 x 10 sec   Heel raises 3 x 10 3 x 10 3 x 10   aridyne 5 minutes 7 minutes    elliptical 2 minutes forward 2 min backward 2 minutes forward  2 minutes back                             Treatment/Session Assessment:    · Response to Treatment: decreased pain after session. decreased antalgic gait. · Compliance with Program/Exercises: compliant most of the time. · Recommendations/Intent for next treatment session: \"Next visit will focus on advancements to more challenging activities\"progress weight bearing exercises as tolerated.  Modalities as needed  ·   Total Treatment Duration:45 minutes      PT Patient Time In/Time Out  Time In: 1000  Time Out: 24[facility]

## 2017-03-07 ENCOUNTER — HOSPITAL ENCOUNTER (OUTPATIENT)
Dept: PHYSICAL THERAPY | Age: 47
Discharge: HOME | End: 2017-03-07
Payer: COMMERCIAL

## 2017-03-07 PROCEDURE — 97110 THERAPEUTIC EXERCISES: CPT

## 2017-03-07 PROCEDURE — 97140 MANUAL THERAPY 1/> REGIONS: CPT

## 2017-03-07 NOTE — PROGRESS NOTES
Shazia Giorgi  : 1970 [facility] at [facility], Jeremiah adams, [address]  [phone]   [phone]         OUTPATIENT PHYSICAL THERAPY:Daily Note and Progress Report 3/7/2017    ICD-10: Treatment Diagnosis: M25.571 pain in joint/ankle /joints of foot and R26.89 other abnormalities of gait and mobility  Precautions/Allergies:   Iodine-excessive weight bearing   Fall Risk Score: 1 (? 5 = High Risk)  MD Orders: evaluate and treat MEDICAL/REFERRING DIAGNOSIS:  Right ankle pain [M25.571]   DATE OF ONSET: 11-10-16  REFERRING PHYSICIAN: Dyan Ochoa DPM  RETURN PHYSICIAN APPOINTMENT: 17     INITIAL ASSESSMENT:  Ms. 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 Decreased Strength  2. Decreased ADL/Functional Activities  3. Decreased Transfer Abilities  4. Decreased Ambulation Ability/Technique  5. Decreased Balance  6. Increased Pain  7. Decreased Activity Tolerance  8.  Decreased Pacing Skills INTERVENTIONS PLANNED:  1. Cryotherapy  2. Gait Training  3. Heat  4. Home Exercise Program (HEP)  5. Manual Therapy  6. Therapeutic Exercise/Strengthening   7. vasopneumatic compression with cold   TREATMENT PLAN:  Effective Dates: 2-15-17 to 3-30-17. Frequency/Duration: 2 times a week for 6 weeks  GOALS: (Goals have been discussed and agreed upon with patient.)  Short-Term Functional Goals: Time Frame: 3-1-17  1. Lateral R ankle pain is less than 2/10 at rest and less than 7/10 after working all day to progress to DC goal -GOAL MET  Instruction in exercise program to allow increased ADLs. -GOAL MET  Discharge Goals: Time Frame:3-30-17  1. No ankle pain at rest and 2-3/10 after working/standing for 8 hours to allow her to perform full home ADLs and most job duties -GOAL MET-PAIN LEVEL IS 0/10  2. Full /painfree ankle mobility to allow ease with putting on shoes and socks and in and out of car and driving/hitting gas and brake pedal without any problem/pain-GOAL MET  3. Independent ambulation with no antalgic gait to allow walking community distances-GOAL MET  4. Able to go up and down curbs with only minimal issue -ONGOING  5. FAAM score is improved from 34/116 to 55/116-GOAL MET-LATEST SCORE IS 87/116    Patient has been seen for 6 visits of R ankle rehab with good progress-pain level is as low as 0/10 with minimal antalgic gait -independent with home exercise program -minimal swelling in R lateral ankle at this time -FAAM score has improved from 34/116 to 87/116-motivated patient -pleasant lady to work with -continue current regimen at this time     Rehabilitation Potential For Stated Goals: Good  Regarding Guy Ziegler's therapy, I certify that the treatment plan above will be carried out by a therapist or under their direction.   Thank you for this referral,  Jensen Russ PT     Referring Physician Signature: Alec Chakraborty DPM              Date                    The information in this section was collected on 2-15-17 (except where otherwise noted). HISTORY:   History of Present Injury/Illness (Reason for Referral):  R inversion ankle sprain on 11-10-16 while jumping/playing with granddaughter  Past Medical History/Comorbidities:   Ms. 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 Ms. Bud Glasgow  has a past surgical history that includes abdomen surgery proc unlisted; partial hysterectomy (2007); and total abdominal hysterectomy. Social History/Living Environment:      Prior Level of Function/Work/Activity:  Independent with home ADLs and job duties and walking with no antalgic gait     Current Medications:       Current Outpatient Prescriptions:     Naproxen Sodium 500 mg TM24, 500 mg daily. , Disp: , Rfl:     ibuprofen 200 mg cap, 200 mg. 2 oral every 6 hours, Disp: , Rfl:     traMADol (ULTRAM) 50 mg tablet, Take 1 Tab by mouth every six (6) hours as needed for Pain., Disp: 20 Tab, Rfl: 0   Date Last Reviewed: 3-7-17   Number of Personal Factors/Comorbidities that affect the Plan of Care-patient has a history of lumbar/arthritis issues and fibromyalgia and is raising her grandchild alone and may have difficulty finding time to come to therapy  : 1-2: MODERATE COMPLEXITY   EXAMINATION:   Palpation:         Minimal tender at R anterior talo fibular ligament and distal peroneal tendon with tight gastrosoleus  ROM:         Ankle range is wfl but guarded on the R -    Circumference at malleoli is 9.0 on L and 9.15 on R -minimal swelling  Strength:          wfl except R ankle evertors are  3+ to 4-/5 but with minimal pain inhibition  Special Tests:         Negative homans sign -good pedal pulses-skin is warm not hot   Balance:          Moderate antalgic gait with walking independently--attempted walking with cane but patient will need practice with this   Mental Status: Alert and oriented and patient wants to take some time off from work to get this ankle well   Sensation:       Intact to light touch    Body Structures Involved:  1. Bones  2. Joints  3. Muscles  4. Ligaments Body Functions Affected:  1. Sensory/Pain  2. Neuromusculoskeletal  3. Movement Related Activities and Participation Affected:  1. Learning and Applying Knowledge  2. General Tasks and Demands  3. Mobility  4. Self Care  5. Community, Social and [place]   Number of elements (examined above) that affect the Plan of Care: 3: MODERATE COMPLEXITY   CLINICAL PRESENTATION:   Presentation: Evolving clinical presentation with changing clinical characteristics: MODERATE COMPLEXITY   CLINICAL DECISION MAKING:   Outcome Measure: Tool Used: PT/OT FOOT AND ANKLE ABILITY MEASURE  Score:  Initial: 34/116 Most Recent: 87/116 (Date: 3-7-17-- )   Interpretation of Score: For the \"Activities of Daily Living\", there are 21 questions each scored on a 5 point scale with 0 representing \"Unable to do\" and 4 representing \"No difficulty\". The lower the score, the greater the functional disability. 84/84 represents no disability. Minimal detectable change is 5.7 points. With the addition of the 8 questions in the \"Sports Subscale,\" there are 29 questions, each scored on a 5 point scale with 0 representing \"Unable to do\" and 4 representing \"No difficulty\". The lower the score, the greater the functional disability. 116/116 represents no disability. Minimal detectable change is 12.3 points.     Activities of Daily Living:  Score 84 83-68 67-51 50-34 33-18 17-1 0   Modifier CH CI CJ CK CL CM CN     Activities of Daily Living + Sports Subscale:  Score 116 115-94 93-71 70-47 46-24 23-1 0   Modifier CH CI CJ CK CL CM CN         Medical Necessity:   · Patient is expected to demonstrate progress in strength, range of motion, balance, coordination and functional technique to increase independence with ADLs and improve safety during walking and transfers. · Skilled intervention continues to be required due to R ankle pain/sprain is affecting function and gait. Reason for Services/Other Comments:  · Patient continues to require modification of therapeutic interventions to increase complexity of exercises. Use of outcome tool(s) and clinical judgement create a POC that gives a: Questionable prediction of patient's progress: MODERATE COMPLEXITY            TREATMENT:   (In addition to Assessment/Re-Assessment sessions the following treatments were rendered)  Pre-treatment Symptoms/Complaints:  Minimal pain today. Pain increases with prolonged standing and increased walking. Pain: Initial:   Pain Intensity 1: 0  Pain Location 1: Ankle  Pain Orientation 1: Lateral, Right  Post Session: 0/10-no  antalgic gait after session-full range of motion with minimal swelling      THERAPEUTIC EXERCISE: (28 minutes): Instructed in use of ice and elevation and exercises and ankle sleeve/support and use of cane     Exercises per grid below to improve mobility, strength, balance and coordination. Required minimal verbal cues to promote proper body alignment and promote proper body posture. Progressed resistance, range, repetitions and complexity of movement as indicated.    Date:  2-27-17 Date:  3-1-17 Date:  3-7-17   Activity/Exercise Parameters Parameters Parameters   gastrocsoleus stretch  Strap x 3  Incline 4 x 30 sec Incline 3 x 30 sec X 4 minutes-stretching between exercise reps    Ankle eversion Green 3 x 10 Green band 3 x 10 5 x 20 with green band   Ankle inversion Green 3 x 10 Green band 3 x 10 Green band 3 x 20    dorsiflexion Green 3 x 10 Green band 3 x 10 Green band 3 x 20    Plantar flexion Green 3 x 10 Green band 3 x 10 Green band 3 x 20    Mini squats      Weight shifts      NU step 6 minutes level 3 10 minutes level 3    Single leg stance 3 x 30 sec Green therapad 4 x 15 sec X 8 with 30 second hold  on blue therapad   Heel raises 3 x 10 3 x 10    aridyne 5 minutes 7 minutes    elliptical 2 minutes forward 2 min backward 2 minutes forward  2 minutes back                         MANUAL THERAPY - soft tissue mobilization to lateral ankle and peroneal musculature and gastrocsoleus x 12 minutes while supine     Cold pack x 8 minutes to R ankle following exercises while supine         Treatment/Session Assessment:    · Response to Treatment: decreased pain and swelling  after session with no antalgic gait -very good progress. · Compliance with Program/Exercises: compliant most of the time. · Recommendations/Intent for next treatment session: \"Next visit will focus on advancements to more challenging activities\"progress weight bearing exercises as tolerated. -push ankle conditioning/stabilization  ·   Total Treatment Duration:48 minutes      PT Patient Time In/Time Out  Time In: 1100  Time Out: 1125 [facility],

## 2017-03-09 ENCOUNTER — HOSPITAL ENCOUNTER (OUTPATIENT)
Dept: PHYSICAL THERAPY | Age: 47
Discharge: HOME | End: 2017-03-09
Payer: COMMERCIAL

## 2017-03-09 PROCEDURE — 97110 THERAPEUTIC EXERCISES: CPT

## 2017-03-09 PROCEDURE — 97140 MANUAL THERAPY 1/> REGIONS: CPT

## 2017-03-09 NOTE — PROGRESS NOTES
Adam Quiles  : 1970 [facility] [address], [address] [place] [address]  [phone]   [phone]         OUTPATIENT PHYSICAL THERAPY:Daily Note 3/9/2017    ICD-10: Treatment Diagnosis: M25.571 pain in joint/ankle /joints of foot and R26.89 other abnormalities of gait and mobility  Precautions/Allergies:   Iodine-excessive weight bearing   Fall Risk Score: 1 (? 5 = High Risk)  MD Orders: evaluate and treat MEDICAL/REFERRING DIAGNOSIS:  Right ankle pain [M25.571]   DATE OF ONSET: 11-10-16  REFERRING PHYSICIAN: Ladonna oH DPM  RETURN PHYSICIAN APPOINTMENT: 17     INITIAL ASSESSMENT:  Ms. 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 Decreased Strength  2. Decreased ADL/Functional Activities  3. Decreased Transfer Abilities  4. Decreased Ambulation Ability/Technique  5. Decreased Balance  6. Increased Pain  7. Decreased Activity Tolerance  8. Decreased Pacing Skills INTERVENTIONS PLANNED:  1. Cryotherapy  2.  Gait Training  3. Heat  4. Home Exercise Program (HEP)  5. Manual Therapy  6. Therapeutic Exercise/Strengthening   7. vasopneumatic compression with cold   TREATMENT PLAN:  Effective Dates: 2-15-17 to 3-30-17. Frequency/Duration: 2 times a week for 6 weeks  GOALS: (Goals have been discussed and agreed upon with patient.)  Short-Term Functional Goals: Time Frame: 3-1-17  1. Lateral R ankle pain is less than 2/10 at rest and less than 7/10 after working all day to progress to DC goal -GOAL MET  Instruction in exercise program to allow increased ADLs. -GOAL MET  Discharge Goals: Time Frame:3-30-17  1. No ankle pain at rest and 2-3/10 after working/standing for 8 hours to allow her to perform full home ADLs and most job duties -GOAL MET-PAIN LEVEL IS 0/10  2. Full /painfree ankle mobility to allow ease with putting on shoes and socks and in and out of car and driving/hitting gas and brake pedal without any problem/pain-GOAL MET  3. Independent ambulation with no antalgic gait to allow walking community distances-GOAL MET  4. Able to go up and down curbs with only minimal issue -ONGOING  5. FAAM score is improved from 34/116 to 55/116-GOAL MET-LATEST SCORE IS 87/116    Patient has been seen for 6 visits of R ankle rehab with good progress-pain level is as low as 0/10 with minimal antalgic gait -independent with home exercise program -minimal swelling in R lateral ankle at this time -FAAM score has improved from 34/116 to 87/116-motivated patient -pleasant lady to work with -continue current regimen at this time     Rehabilitation Potential For Stated Goals: Good  Regarding Mary Ziegler's therapy, I certify that the treatment plan above will be carried out by a therapist or under their direction.   Thank you for this referral,  Judy Garza PTA     Referring Physician Signature: Maricruz Chakraborty DPM              Date                    The information in this section was collected on 2-15-17 (except where otherwise noted). HISTORY:   History of Present Injury/Illness (Reason for Referral):  R inversion ankle sprain on 11-10-16 while jumping/playing with granddaughter  Past Medical History/Comorbidities:   Ms. 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 Ms. George Washington  has a past surgical history that includes abdomen surgery proc unlisted; partial hysterectomy (2007); and total abdominal hysterectomy. Social History/Living Environment:      Prior Level of Function/Work/Activity:  Independent with home ADLs and job duties and walking with no antalgic gait     Current Medications:       Current Outpatient Prescriptions:     Naproxen Sodium 500 mg TM24, 500 mg daily. , Disp: , Rfl:     ibuprofen 200 mg cap, 200 mg. 2 oral every 6 hours, Disp: , Rfl:     traMADol (ULTRAM) 50 mg tablet, Take 1 Tab by mouth every six (6) hours as needed for Pain., Disp: 20 Tab, Rfl: 0   Date Last Reviewed: 3-7-17   Number of Personal Factors/Comorbidities that affect the Plan of Care-patient has a history of lumbar/arthritis issues and fibromyalgia and is raising her grandchild alone and may have difficulty finding time to come to therapy  : 1-2: MODERATE COMPLEXITY   EXAMINATION:   Palpation:         Minimal tender at R anterior talo fibular ligament and distal peroneal tendon with tight gastrosoleus  ROM:         Ankle range is wfl but guarded on the R -    Circumference at malleoli is 9.0 on L and 9.15 on R -minimal swelling  Strength:          wfl except R ankle evertors are  3+ to 4-/5 but with minimal pain inhibition  Special Tests:         Negative homans sign -good pedal pulses-skin is warm not hot   Balance:          Moderate antalgic gait with walking independently--attempted walking with cane but patient will need practice with this   Mental Status:         Alert and oriented and patient wants to take some time off from work to get this ankle well   Sensation:       Intact to light touch    Body Structures Involved:  1. Bones  2. Joints  3. Muscles  4. Ligaments Body Functions Affected:  1. Sensory/Pain  2. Neuromusculoskeletal  3. Movement Related Activities and Participation Affected:  1. Learning and Applying Knowledge  2. General Tasks and Demands  3. Mobility  4. Self Care  5. Community, Social and [place]   Number of elements (examined above) that affect the Plan of Care: 3: MODERATE COMPLEXITY   CLINICAL PRESENTATION:   Presentation: Evolving clinical presentation with changing clinical characteristics: MODERATE COMPLEXITY   CLINICAL DECISION MAKING:   Outcome Measure: Tool Used: PT/OT FOOT AND ANKLE ABILITY MEASURE  Score:  Initial: 34/116 Most Recent: 87/116 (Date: 3-7-17-- )   Interpretation of Score: For the \"Activities of Daily Living\", there are 21 questions each scored on a 5 point scale with 0 representing \"Unable to do\" and 4 representing \"No difficulty\". The lower the score, the greater the functional disability. 84/84 represents no disability. Minimal detectable change is 5.7 points. With the addition of the 8 questions in the \"Sports Subscale,\" there are 29 questions, each scored on a 5 point scale with 0 representing \"Unable to do\" and 4 representing \"No difficulty\". The lower the score, the greater the functional disability. 116/116 represents no disability. Minimal detectable change is 12.3 points.     Activities of Daily Living:  Score 84 83-68 67-51 50-34 33-18 17-1 0   Modifier CH CI CJ CK CL CM CN     Activities of Daily Living + Sports Subscale:  Score 116 115-94 93-71 70-47 46-24 23-1 0   Modifier CH CI CJ CK CL CM CN         Medical Necessity:   · Patient is expected to demonstrate progress in strength, range of motion, balance, coordination and functional technique to increase independence with ADLs and improve safety during walking and transfers. · Skilled intervention continues to be required due to R ankle pain/sprain is affecting function and gait. Reason for Services/Other Comments:  · Patient continues to require modification of therapeutic interventions to increase complexity of exercises. Use of outcome tool(s) and clinical judgement create a POC that gives a: Questionable prediction of patient's progress: MODERATE COMPLEXITY            TREATMENT:   (In addition to Assessment/Re-Assessment sessions the following treatments were rendered)  Pre-treatment Symptoms/Complaints:  Minimal pain today. Over all improved gait and mobility. Pain: Initial:   Pain Intensity 1: 0  Pain Location 1: Ankle  Pain Orientation 1: Right, Lateral  Post Session: 0/10-     THERAPEUTIC EXERCISE: (35 minutes):      Exercises per grid below to improve mobility, strength, balance and coordination. Required minimal verbal cues to promote proper body alignment and promote proper body posture. Progressed resistance, range, repetitions and complexity of movement as indicated.    Date:   Date:  3-1-17 Date:  3-7-17   Activity/Exercise Parameters Parameters Parameters   gastrocsoleus stretch    Incline 4 x 30 sec Incline 3 x 30 sec X 4 minutes-stretching between exercise reps    Ankle eversion Green 3 x 10 Green band 3 x 10 5 x 20 with green band   Ankle inversion Green 3 x 10 Green band 3 x 10 Green band 3 x 20    dorsiflexion Green 3 x 10 Green band 3 x 10 Green band 3 x 20    Plantar flexion Green 3 x 10 Green band 3 x 10 Green band 3 x 20    Mini squats      Weight shifts      NU step 8 minutes level 3 10 minutes level 3    Single leg stance 3 x 30 sec blue therapad Green therapad 4 x 15 sec X 8 with 30 second hold  on blue therapad   Heel raises 3 x 10 3 x 10    aridyne 5 minutes 7 minutes    elliptical  2 minutes forward  2 minutes back                         MANUAL THERAPY - soft tissue mobilization to lateral ankle ,peroneal musculature and gastrocsoleus x 15 minutes while supine       Treatment/Session Assessment:    · Response to Treatment: decreased pain   after session   · Compliance with Program/Exercises: compliant most of the time. · Recommendations/Intent for next treatment session: \"Next visit will focus on advancements to more challenging activities\"progress weight bearing exercises as tolerated. -  ·   Total Treatment Duration:50 minutes      PT Patient Time In/Time Out  Time In: 1100  Time Out: [address] [facility], [facility]

## 2017-03-13 ENCOUNTER — HOSPITAL ENCOUNTER (OUTPATIENT)
Dept: PHYSICAL THERAPY | Age: 47
Discharge: HOME | End: 2017-03-13
Payer: COMMERCIAL

## 2017-03-13 PROCEDURE — 97110 THERAPEUTIC EXERCISES: CPT

## 2017-03-13 PROCEDURE — 97140 MANUAL THERAPY 1/> REGIONS: CPT

## 2017-03-13 NOTE — PROGRESS NOTES
María Whitaker  : 1970 [facility] [address], [address] [place] [address]  [phone]   [phone]         OUTPATIENT PHYSICAL THERAPY:Daily Note 3/13/2017    ICD-10: Treatment Diagnosis: M25.571 pain in joint/ankle /joints of foot and R26.89 other abnormalities of gait and mobility  Precautions/Allergies:   Iodine-excessive weight bearing   Fall Risk Score: 1 (? 5 = High Risk)  MD Orders: evaluate and treat MEDICAL/REFERRING DIAGNOSIS:  Right ankle pain [M25.571]   DATE OF ONSET: 11-10-16  REFERRING PHYSICIAN: Suanne Habermann, DPM  RETURN PHYSICIAN APPOINTMENT: 17     INITIAL ASSESSMENT:  Ms. 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 Decreased Strength  2. Decreased ADL/Functional Activities  3. Decreased Transfer Abilities  4. Decreased Ambulation Ability/Technique  5. Decreased Balance  6. Increased Pain  7. Decreased Activity Tolerance  8. Decreased Pacing Skills INTERVENTIONS PLANNED:  1. Cryotherapy  2.  Gait Training  3. Heat  4. Home Exercise Program (HEP)  5. Manual Therapy  6. Therapeutic Exercise/Strengthening   7. vasopneumatic compression with cold   TREATMENT PLAN:  Effective Dates: 2-15-17 to 3-30-17. Frequency/Duration: 2 times a week for 6 weeks  GOALS: (Goals have been discussed and agreed upon with patient.)  Short-Term Functional Goals: Time Frame: 3-1-17  1. Lateral R ankle pain is less than 2/10 at rest and less than 7/10 after working all day to progress to DC goal -GOAL MET  Instruction in exercise program to allow increased ADLs. -GOAL MET  Discharge Goals: Time Frame:3-30-17  1. No ankle pain at rest and 2-3/10 after working/standing for 8 hours to allow her to perform full home ADLs and most job duties -GOAL MET-PAIN LEVEL IS 0/10  2. Full /painfree ankle mobility to allow ease with putting on shoes and socks and in and out of car and driving/hitting gas and brake pedal without any problem/pain-GOAL MET  3. Independent ambulation with no antalgic gait to allow walking community distances-GOAL MET  4. Able to go up and down curbs with only minimal issue -ONGOING  5. FAAM score is improved from 34/116 to 55/116-GOAL MET-LATEST SCORE IS 87/116    Patient has been seen for 6 visits of R ankle rehab with good progress-pain level is as low as 0/10 with minimal antalgic gait -independent with home exercise program -minimal swelling in R lateral ankle at this time -FAAM score has improved from 34/116 to 87/116-motivated patient -pleasant lady to work with -continue current regimen at this time     Rehabilitation Potential For Stated Goals: Good  Regarding John Danae Ziegler's therapy, I certify that the treatment plan above will be carried out by a therapist or under their direction.   Thank you for this referral,  Sheldon Desai PTA     Referring Physician Signature: Pattis, Bethann Severance, DPM              Date                    The information in this section was collected on 2-15-17 (except where otherwise noted). HISTORY:   History of Present Injury/Illness (Reason for Referral):  R inversion ankle sprain on 11-10-16 while jumping/playing with granddaughter  Past Medical History/Comorbidities:   Ms. 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 Ms. Den Vernon  has a past surgical history that includes abdomen surgery proc unlisted; partial hysterectomy (2007); and total abdominal hysterectomy. Social History/Living Environment:      Prior Level of Function/Work/Activity:  Independent with home ADLs and job duties and walking with no antalgic gait     Current Medications:       Current Outpatient Prescriptions:     Naproxen Sodium 500 mg TM24, 500 mg daily. , Disp: , Rfl:     ibuprofen 200 mg cap, 200 mg. 2 oral every 6 hours, Disp: , Rfl:     traMADol (ULTRAM) 50 mg tablet, Take 1 Tab by mouth every six (6) hours as needed for Pain., Disp: 20 Tab, Rfl: 0   Date Last Reviewed: 3-13-17   Number of Personal Factors/Comorbidities that affect the Plan of Care-patient has a history of lumbar/arthritis issues and fibromyalgia and is raising her grandchild alone and may have difficulty finding time to come to therapy  : 1-2: MODERATE COMPLEXITY   EXAMINATION:   Palpation:         Minimal tender at R anterior talo fibular ligament and distal peroneal tendon with tight gastrosoleus  ROM:         Ankle range is wfl but guarded on the R -    Circumference at malleoli is 9.0 on L and 9.15 on R -minimal swelling  Strength:          wfl except R ankle evertors are  3+ to 4-/5 but with minimal pain inhibition  Special Tests:         Negative homans sign -good pedal pulses-skin is warm not hot   Balance:          Moderate antalgic gait with walking independently--attempted walking with cane but patient will need practice with this   Mental Status:         Alert and oriented and patient wants to take some time off from work to get this ankle well   Sensation:       Intact to light touch    Body Structures Involved:  1. Bones  2. Joints  3. Muscles  4. Ligaments Body Functions Affected:  1. Sensory/Pain  2. Neuromusculoskeletal  3. Movement Related Activities and Participation Affected:  1. Learning and Applying Knowledge  2. General Tasks and Demands  3. Mobility  4. Self Care  5. Community, Social and [place]   Number of elements (examined above) that affect the Plan of Care: 3: MODERATE COMPLEXITY   CLINICAL PRESENTATION:   Presentation: Evolving clinical presentation with changing clinical characteristics: MODERATE COMPLEXITY   CLINICAL DECISION MAKING:   Outcome Measure: Tool Used: PT/OT FOOT AND ANKLE ABILITY MEASURE  Score:  Initial: 34/116 Most Recent: 87/116 (Date: 3-7-17-- )   Interpretation of Score: For the \"Activities of Daily Living\", there are 21 questions each scored on a 5 point scale with 0 representing \"Unable to do\" and 4 representing \"No difficulty\". The lower the score, the greater the functional disability. 84/84 represents no disability. Minimal detectable change is 5.7 points. With the addition of the 8 questions in the \"Sports Subscale,\" there are 29 questions, each scored on a 5 point scale with 0 representing \"Unable to do\" and 4 representing \"No difficulty\". The lower the score, the greater the functional disability. 116/116 represents no disability. Minimal detectable change is 12.3 points.     Activities of Daily Living:  Score 84 83-68 67-51 50-34 33-18 17-1 0   Modifier CH CI CJ CK CL CM CN     Activities of Daily Living + Sports Subscale:  Score 116 115-94 93-71 70-47 46-24 23-1 0   Modifier CH CI CJ CK CL CM CN         Medical Necessity:   · Patient is expected to demonstrate progress in strength, range of motion, balance, coordination and functional technique to increase independence with ADLs and improve safety during walking and transfers. · Skilled intervention continues to be required due to R ankle pain/sprain is affecting function and gait. Reason for Services/Other Comments:  · Patient continues to require modification of therapeutic interventions to increase complexity of exercises. Use of outcome tool(s) and clinical judgement create a POC that gives a: Questionable prediction of patient's progress: MODERATE COMPLEXITY            TREATMENT:   (In addition to Assessment/Re-Assessment sessions the following treatments were rendered)  Pre-treatment Symptoms/Complaints:  Minimal pain today. Reports less pain at end of the day. .  Pain: Initial:   Pain Intensity 1: 1  Pain Location 1: Ankle  Pain Orientation 1: Right, Lateral  Post Session: 0/10-     THERAPEUTIC EXERCISE: (30 minutes):      Exercises per grid below to improve mobility, strength, balance and coordination. Required minimal verbal cues to promote proper body alignment and promote proper body posture. Progressed resistance, range, repetitions and complexity of movement as indicated.    Date:  3-13-17 Date:  3-1-17 Date:  3-7-17   Activity/Exercise Parameters Parameters Parameters   gastrocsoleus stretch    Incline 4 x 30 sec Incline 3 x 30 sec X 4 minutes-stretching between exercise reps    Ankle eversion Green 3 x 15 Green band 3 x 10 5 x 20 with green band   Ankle inversion Green 3 x 15 Green band 3 x 10 Green band 3 x 20    dorsiflexion Green 3 x 15 Green band 3 x 10 Green band 3 x 20    Plantar flexion Green 3 x 15 Green band 3 x 10 Green band 3 x 20    Mini squats      Weight shifts      NU step 8 minutes level 3 10 minutes level 3    Single leg stance 6 x 15 sec sec blue therapad Green therapad 4 x 15 sec X 8 with 30 second hold  on blue therapad   Heel raises 3 x 10 3 x 10    aridyne 7 minutes 7 minutes    elliptical  2 minutes forward  2 minutes back                         MANUAL THERAPY - soft tissue mobilization to lateral ankle ,peroneal musculature and gastrocsoleus x 15 minutes while supine       Treatment/Session Assessment:    · Response to Treatment: decreased pain   after session   · Compliance with Program/Exercises: compliant most of the time. · Recommendations/Intent for next treatment session: \"Next visit will focus on advancements to more challenging activities\"progress weight bearing exercises as tolerated. -  ·   Total Treatment Duration:45 minutes      PT Patient Time In/Time Out  Time In: 7452  Time Out: [address], [address] [place], [facility]

## 2017-03-15 ENCOUNTER — HOSPITAL ENCOUNTER (OUTPATIENT)
Dept: PHYSICAL THERAPY | Age: 47
Discharge: HOME | End: 2017-03-15
Payer: COMMERCIAL

## 2017-03-15 PROCEDURE — 97110 THERAPEUTIC EXERCISES: CPT

## 2017-03-15 PROCEDURE — 97140 MANUAL THERAPY 1/> REGIONS: CPT

## 2017-03-15 NOTE — PROGRESS NOTES
Courtney Brenda  : 1970 [facility], Jeremiah adams, [address]  [phone]   [phone]         OUTPATIENT PHYSICAL THERAPY:Daily Note 3/15/2017    ICD-10: Treatment Diagnosis: M25.571 pain in joint/ankle /joints of foot and R26.89 other abnormalities of gait and mobility  Precautions/Allergies:   Iodine-excessive weight bearing   Fall Risk Score: 1 (? 5 = High Risk)  MD Orders: evaluate and treat MEDICAL/REFERRING DIAGNOSIS:  Right ankle pain [M25.571]   DATE OF ONSET: 11-10-16  REFERRING PHYSICIAN: Giovanna Conner DPM  RETURN PHYSICIAN APPOINTMENT: 17     INITIAL ASSESSMENT:  Ms. 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 Decreased Strength  2. Decreased ADL/Functional Activities  3. Decreased Transfer Abilities  4. Decreased Ambulation Ability/Technique  5. Decreased Balance  6. Increased Pain  7. Decreased Activity Tolerance  8. Decreased Pacing Skills INTERVENTIONS PLANNED:  1. Cryotherapy  2.  Gait Training  3. Heat  4. Home Exercise Program (HEP)  5. Manual Therapy  6. Therapeutic Exercise/Strengthening   7. vasopneumatic compression with cold   TREATMENT PLAN:  Effective Dates: 2-15-17 to 3-30-17. Frequency/Duration: 2 times a week for 6 weeks  GOALS: (Goals have been discussed and agreed upon with patient.)  Short-Term Functional Goals: Time Frame: 3-1-17  1. Lateral R ankle pain is less than 2/10 at rest and less than 7/10 after working all day to progress to DC goal -GOAL MET  Instruction in exercise program to allow increased ADLs. -GOAL MET  Discharge Goals: Time Frame:3-30-17  1. No ankle pain at rest and 2-3/10 after working/standing for 8 hours to allow her to perform full home ADLs and most job duties -GOAL MET-PAIN LEVEL IS 0/10  2. Full /painfree ankle mobility to allow ease with putting on shoes and socks and in and out of car and driving/hitting gas and brake pedal without any problem/pain-GOAL MET  3. Independent ambulation with no antalgic gait to allow walking community distances-GOAL MET  4. Able to go up and down curbs with only minimal issue -ONGOING  5. FAAM score is improved from 34/116 to 55/116-GOAL MET-LATEST SCORE IS 87/116    Patient has been seen for 6 visits of R ankle rehab with good progress-pain level is as low as 0/10 with minimal antalgic gait -independent with home exercise program -minimal swelling in R lateral ankle at this time -FAAM score has improved from 34/116 to 87/116-motivated patient -pleasant lady to work with -continue current regimen at this time     Rehabilitation Potential For Stated Goals: Good  Regarding John Danae Ziegler's therapy, I certify that the treatment plan above will be carried out by a therapist or under their direction.   Thank you for this referral,  Fabian Rinaldi PT     Referring Physician Signature: Pattis, Bethann Severance, DPM              Date                    The information in this section was collected on 2-15-17 (except where otherwise noted). HISTORY:   History of Present Injury/Illness (Reason for Referral):  R inversion ankle sprain on 11-10-16 while jumping/playing with granddaughter  Past Medical History/Comorbidities:   Ms. 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 Ms. Kristy Churchill  has a past surgical history that includes abdomen surgery proc unlisted; partial hysterectomy (2007); and total abdominal hysterectomy. Social History/Living Environment:      Prior Level of Function/Work/Activity:  Independent with home ADLs and job duties and walking with no antalgic gait     Current Medications:       Current Outpatient Prescriptions:     Naproxen Sodium 500 mg TM24, 500 mg daily. , Disp: , Rfl:     ibuprofen 200 mg cap, 200 mg. 2 oral every 6 hours, Disp: , Rfl:     traMADol (ULTRAM) 50 mg tablet, Take 1 Tab by mouth every six (6) hours as needed for Pain., Disp: 20 Tab, Rfl: 0   Date Last Reviewed: 3-13-17   Number of Personal Factors/Comorbidities that affect the Plan of Care-patient has a history of lumbar/arthritis issues and fibromyalgia and is raising her grandchild alone and may have difficulty finding time to come to therapy  : 1-2: MODERATE COMPLEXITY   EXAMINATION:   Palpation:         Minimal tender at R anterior talo fibular ligament and distal peroneal tendon with tight gastrosoleus  ROM:         Ankle range is wfl but guarded on the R -    Circumference at malleoli is 9.0 on L and 9.15 on R -minimal swelling  Strength:          wfl except R ankle evertors are  3+ to 4-/5 but with minimal pain inhibition  Special Tests:         Negative homans sign -good pedal pulses-skin is warm not hot   Balance:          Moderate antalgic gait with walking independently--attempted walking with cane but patient will need practice with this   Mental Status:         Alert and oriented and patient wants to take some time off from work to get this ankle well   Sensation:       Intact to light touch    Body Structures Involved:  1. Bones  2. Joints  3. Muscles  4. Ligaments Body Functions Affected:  1. Sensory/Pain  2. Neuromusculoskeletal  3. Movement Related Activities and Participation Affected:  1. Learning and Applying Knowledge  2. General Tasks and Demands  3. Mobility  4. Self Care  5. Community, Social and Geff Schriever   Number of elements (examined above) that affect the Plan of Care: 3: MODERATE COMPLEXITY   CLINICAL PRESENTATION:   Presentation: Evolving clinical presentation with changing clinical characteristics: MODERATE COMPLEXITY   CLINICAL DECISION MAKING:   Outcome Measure: Tool Used: PT/OT FOOT AND ANKLE ABILITY MEASURE  Score:  Initial: 34/116 Most Recent: 87/116 (Date: 3-7-17-- )   Interpretation of Score: For the \"Activities of Daily Living\", there are 21 questions each scored on a 5 point scale with 0 representing \"Unable to do\" and 4 representing \"No difficulty\". The lower the score, the greater the functional disability. 84/84 represents no disability. Minimal detectable change is 5.7 points. With the addition of the 8 questions in the \"Sports Subscale,\" there are 29 questions, each scored on a 5 point scale with 0 representing \"Unable to do\" and 4 representing \"No difficulty\". The lower the score, the greater the functional disability. 116/116 represents no disability. Minimal detectable change is 12.3 points.     Activities of Daily Living:  Score 84 83-68 67-51 50-34 33-18 17-1 0   Modifier CH CI CJ CK CL CM CN     Activities of Daily Living + Sports Subscale:  Score 116 115-94 93-71 70-47 46-24 23-1 0   Modifier CH CI CJ CK CL CM CN         Medical Necessity:   · Patient is expected to demonstrate progress in strength, range of motion, balance, coordination and functional technique to increase independence with ADLs and improve safety during walking and transfers. · Skilled intervention continues to be required due to R ankle pain/sprain is affecting function and gait. Reason for Services/Other Comments:  · Patient continues to require modification of therapeutic interventions to increase complexity of exercises. Use of outcome tool(s) and clinical judgement create a POC that gives a: Questionable prediction of patient's progress: MODERATE COMPLEXITY            TREATMENT:   (In addition to Assessment/Re-Assessment sessions the following treatments were rendered)  Pre-treatment Symptoms/Complaints:  Minimal pain today. Reports less pain at end of the day. .  Pain: Initial:   Pain Intensity 1: 1  Pain Location 1: Ankle  Pain Orientation 1: Lateral, Right  Pain Intervention(s) 1: Cold pack, Exercise  Post Session: 0-1/10 after session -mild lateral ankle soreness -good progress with increased ankle stability-tolerated increased exercise work load     THERAPEUTIC EXERCISE: (30 minutes):      Exercises per grid below to improve mobility, strength, balance and coordination. Required minimal verbal cues to promote proper body alignment and promote proper body posture. Progressed resistance, range, repetitions and complexity of movement as indicated.    Date:  3-13-17 Date:  3-1517 Date:  3-7-17   Activity/Exercise Parameters Parameters Parameters   gastrocsoleus stretch    Incline 4 x 30 sec X 3 minutes on incline     X 2 minutes by therapist between exercises X 4 minutes-stretching between exercise reps    Ankle eversion Green 3 x 15 10 x 20 reps with red band  5 x 20 with green band   Ankle inversion Green 3 x 15 2 x 20 reps with red band  Green band 3 x 20    dorsiflexion Green 3 x 15 2 x 20 reps with red band  Green band 3 x 20    Plantar flexion Green 3 x 15 2 x 20 reps with red band  Green band 3 x 20    Mini squats      Weight shifts      NU step 8 minutes level 3 12 minutes level 3    Single leg stance 6 x 15 sec sec blue therapad Green therapad 6 x 20 seconds X 8 with 30 second hold  on blue therapad   Heel raises 3 x 10     aridyne 7 minutes     elliptical                           MANUAL THERAPY - soft tissue mobilization to lateral ankle ,peroneal musculature and gastrocsoleus x 15 minutes while supine     Cold pack x 8 minutes to R ankle while supine following exercises      Treatment/Session Assessment:    · Response to Treatment: decreased pain and no swelling noted -tolerated increased exercise workload   · Compliance with Program/Exercises: compliant most of the time. · Recommendations/Intent for next treatment session: \"Next visit will focus on advancements to more challenging activities\"progress weight bearing exercises as tolerated. -push stabilization work followed with cold pack   ·   Total Treatment Duration:53 minutes      PT Patient Time In/Time Out  Time In: 1000  Time Out: [address], PT

## 2017-03-22 ENCOUNTER — HOSPITAL ENCOUNTER (OUTPATIENT)
Dept: PHYSICAL THERAPY | Age: 47
Discharge: HOME | End: 2017-03-22
Payer: COMMERCIAL

## 2017-03-22 PROCEDURE — 97110 THERAPEUTIC EXERCISES: CPT

## 2017-03-22 PROCEDURE — 97140 MANUAL THERAPY 1/> REGIONS: CPT

## 2017-03-22 NOTE — PROGRESS NOTES
Adeel Segovia  : 1970 [facility] [place] [address], Jeremiah adams, [address] [facility]  [phone]   [phone]         OUTPATIENT PHYSICAL THERAPY:Daily Note 3/22/2017    ICD-10: Treatment Diagnosis: M25.571 pain in joint/ankle /joints of foot and R26.89 other abnormalities of gait and mobility  Precautions/Allergies:   Iodine-excessive weight bearing   Fall Risk Score: 1 (? 5 = High Risk)  MD Orders: evaluate and treat MEDICAL/REFERRING DIAGNOSIS:  Right ankle pain [M25.571]   DATE OF ONSET: 11-10-16  REFERRING PHYSICIAN: Abimael Alonzo DPM  RETURN PHYSICIAN APPOINTMENT: 17     INITIAL ASSESSMENT:  Ms. 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 Decreased Strength  2. Decreased ADL/Functional Activities  3. Decreased Transfer Abilities  4. Decreased Ambulation Ability/Technique  5. Decreased Balance  6. Increased Pain  7. Decreased Activity Tolerance  8. Decreased Pacing Skills INTERVENTIONS PLANNED:  1. Cryotherapy  2.  Gait Training  3. Heat  4. Home Exercise Program (HEP)  5. Manual Therapy  6. Therapeutic Exercise/Strengthening   7. vasopneumatic compression with cold   TREATMENT PLAN:  Effective Dates: 2-15-17 to 3-30-17. Frequency/Duration: 2 times a week for 6 weeks  GOALS: (Goals have been discussed and agreed upon with patient.)  Short-Term Functional Goals: Time Frame: 3-1-17  1. Lateral R ankle pain is less than 2/10 at rest and less than 7/10 after working all day to progress to DC goal -GOAL MET  Instruction in exercise program to allow increased ADLs. -GOAL MET  Discharge Goals: Time Frame:3-30-17  1. No ankle pain at rest and 2-3/10 after working/standing for 8 hours to allow her to perform full home ADLs and most job duties -GOAL MET-PAIN LEVEL IS 0/10  2. Full /painfree ankle mobility to allow ease with putting on shoes and socks and in and out of car and driving/hitting gas and brake pedal without any problem/pain-GOAL MET  3. Independent ambulation with no antalgic gait to allow walking community distances-GOAL MET  4. Able to go up and down curbs with only minimal issue -ONGOING  5. FAAM score is improved from 34/116 to 55/116-GOAL MET-LATEST SCORE IS 87/116    Patient has been seen for 6 visits of R ankle rehab with good progress-pain level is as low as 0/10 with minimal antalgic gait -independent with home exercise program -minimal swelling in R lateral ankle at this time -FAAM score has improved from 34/116 to 87/116-motivated patient -pleasant lady to work with -continue current regimen at this time     Rehabilitation Potential For Stated Goals: Good  Regarding [facility], I certify that the treatment plan above will be carried out by a therapist or under their direction.   Thank you for this referral,  Cm Aldana PT     Referring Physician Signature: Elena Chakraborty DPM              Date                    The information in this section was collected on 2-15-17 (except where otherwise noted). HISTORY:   History of Present Injury/Illness (Reason for Referral):  R inversion ankle sprain on 11-10-16 while jumping/playing with granddaughter  Past Medical History/Comorbidities:   Ms. 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 Ms. Vera Swanson  has a past surgical history that includes abdomen surgery proc unlisted; partial hysterectomy (2007); and total abdominal hysterectomy. Social History/Living Environment:      Prior Level of Function/Work/Activity:  Independent with home ADLs and job duties and walking with no antalgic gait     Current Medications:       Current Outpatient Prescriptions:     Naproxen Sodium 500 mg TM24, 500 mg daily. , Disp: , Rfl:     ibuprofen 200 mg cap, 200 mg. 2 oral every 6 hours, Disp: , Rfl:     traMADol (ULTRAM) 50 mg tablet, Take 1 Tab by mouth every six (6) hours as needed for Pain., Disp: 20 Tab, Rfl: 0   Date Last Reviewed: 3-22-17   Number of Personal Factors/Comorbidities that affect the Plan of Care-patient has a history of lumbar/arthritis issues and fibromyalgia and is raising her grandchild alone and may have difficulty finding time to come to therapy  : 1-2: MODERATE COMPLEXITY   EXAMINATION:   Palpation:         Minimal tender at R anterior talo fibular ligament and distal peroneal tendon with tight gastrosoleus  ROM:         Ankle range is wfl but guarded on the R -    Circumference at malleoli is 9.0 on L and 9.15 on R -minimal swelling  Strength:          wfl except R ankle evertors are  3+ to 4-/5 but with minimal pain inhibition  Special Tests:         Negative homans sign -good pedal pulses-skin is warm not hot   Balance:          Moderate antalgic gait with walking independently--attempted walking with cane but patient will need practice with this   Mental Status:         Alert and oriented and patient wants to take some time off from work to get this ankle well   Sensation:       Intact to light touch    Body Structures Involved:  1. Bones  2. Joints  3. Muscles  4. Ligaments Body Functions Affected:  1. Sensory/Pain  2. Neuromusculoskeletal  3. Movement Related Activities and Participation Affected:  1. Learning and Applying Knowledge  2. General Tasks and Demands  3. Mobility  4. Self Care  5. Community, Social and [place]   Number of elements (examined above) that affect the Plan of Care: 3: MODERATE COMPLEXITY   CLINICAL PRESENTATION:   Presentation: Evolving clinical presentation with changing clinical characteristics: MODERATE COMPLEXITY   CLINICAL DECISION MAKING:   Outcome Measure: Tool Used: PT/OT FOOT AND ANKLE ABILITY MEASURE  Score:  Initial: 34/116 Most Recent: 87/116 (Date: 3-7-17-- )   Interpretation of Score: For the \"Activities of Daily Living\", there are 21 questions each scored on a 5 point scale with 0 representing \"Unable to do\" and 4 representing \"No difficulty\". The lower the score, the greater the functional disability. 84/84 represents no disability. Minimal detectable change is 5.7 points. With the addition of the 8 questions in the \"Sports Subscale,\" there are 29 questions, each scored on a 5 point scale with 0 representing \"Unable to do\" and 4 representing \"No difficulty\". The lower the score, the greater the functional disability. 116/116 represents no disability. Minimal detectable change is 12.3 points.     Activities of Daily Living:  Score 84 83-68 67-51 50-34 33-18 17-1 0   Modifier CH CI CJ CK CL CM CN     Activities of Daily Living + Sports Subscale:  Score 116 115-94 93-71 70-47 46-24 23-1 0   Modifier CH CI CJ CK CL CM CN         Medical Necessity:   · Patient is expected to demonstrate progress in strength, range of motion, balance, coordination and functional technique to increase independence with ADLs and improve safety during walking and transfers. · Skilled intervention continues to be required due to R ankle pain/sprain is affecting function and gait. Reason for Services/Other Comments:  · Patient continues to require modification of therapeutic interventions to increase complexity of exercises. Use of outcome tool(s) and clinical judgement create a POC that gives a: Questionable prediction of patient's progress: MODERATE COMPLEXITY            TREATMENT:   (In addition to Assessment/Re-Assessment sessions the following treatments were rendered)  Pre-treatment Symptoms/Complaints:  Minimal pain today in ankle but I have been sick with a fever and feeling very bloated -more concerned about my stomach today than I am about my ankle as I was very busy this weekend with grandchild birthday duties that required me to be on the go quite a bit so I am a bit tired as well . Pain: Initial:   Pain Intensity 1: 0  Pain Location 1: Ankle  Pain Orientation 1: Lateral, Right  Pain Intervention(s) 1: Cold pack, Exercise  Post Session: 0-1/10 after session -mild lateral ankle soreness -good progress with increased ankle stability-tolerated increased exercise work load     THERAPEUTIC EXERCISE: (28 minutes):      Exercises per grid below to improve mobility, strength, balance and coordination. Required minimal verbal cues to promote proper body alignment and promote proper body posture. Progressed resistance, range, repetitions and complexity of movement as indicated.    Date:  3-13-17 Date:  3-15-17 Date:  3-22-17   Activity/Exercise Parameters Parameters Parameters   gastrocsoleus stretch    Incline 4 x 30 sec X 3 minutes on incline     X 2 minutes by therapist between exercises X 3 minutes on incline board and 2 minutes by therapist between exercise sets   Ankle eversion Green 3 x 15 10 x 20 reps with red band  6x 20 with green band   Ankle inversion Green 3 x 15 2 x 20 reps with red band     dorsiflexion Green 3 x 15 2 x 20 reps with red band     Plantar flexion Green 3 x 15 2 x 20 reps with red band     Mini squats      Weight shifts      NU step 8 minutes level 3 12 minutes level 3 X 12 minutes at level 4    Single leg stance 6 x 15 sec sec blue therapad Green therapad 6 x 20 seconds X 8 with 30 second hold  on blue therapad and x 8 on BOSU    Heel raises 3 x 10     aridyne 7 minutes     elliptical                           MANUAL THERAPY - soft tissue mobilization to lateral ankle ,peroneal musculature and gastrocsoleus x 15 minutes while supine     vasopneumatic compression with cold x 15 minutes while supine       NO CHARGE FOR CRYOTHERAPY /VASOPNEUMATIC COMPRESSION WITH COLD       Treatment/Session Assessment:    · Response to Treatment: good progress-decreased pain and no swelling noted -tolerated increased exercise workload   · Compliance with Program/Exercises: compliant most of the time. · Recommendations/Intent for next treatment session: \"Next visit will focus on advancements to more challenging activities\"progress weight bearing exercises as tolerated. -push stabilization work followed with cold pack   ·   Total Treatment Duration:58 minutes      PT Patient Time In/Time Out  Time In: 1008  Time Out: NOHEMI [place]

## 2017-03-26 ENCOUNTER — APPOINTMENT (OUTPATIENT)
Dept: GENERAL RADIOLOGY | Age: 47
End: 2017-03-26
Attending: EMERGENCY MEDICINE
Payer: COMMERCIAL

## 2017-03-26 ENCOUNTER — HOSPITAL ENCOUNTER (EMERGENCY)
Age: 47
Discharge: HOME | End: 2017-03-27
Payer: COMMERCIAL

## 2017-03-26 DIAGNOSIS — R10.84 ABDOMINAL PAIN, GENERALIZED: Primary | ICD-10-CM

## 2017-03-26 LAB
ALBUMIN SERPL BCP-MCNC: 3.8 G/DL (ref 3.5–5)
ALBUMIN/GLOB SERPL: 1 {RATIO} (ref 1.2–3.5)
ALP SERPL-CCNC: 52 U/L (ref 50–136)
ALT SERPL-CCNC: 49 U/L (ref 12–65)
ANION GAP BLD CALC-SCNC: 8 MMOL/L (ref 7–16)
AST SERPL W P-5'-P-CCNC: 23 U/L (ref 15–37)
BASOPHILS # BLD AUTO: 0 K/UL (ref 0–0.2)
BASOPHILS # BLD: 1 % (ref 0–2)
BILIRUB SERPL-MCNC: 0.4 MG/DL (ref 0.2–1.1)
BUN SERPL-MCNC: 8 MG/DL (ref 6–23)
CALCIUM SERPL-MCNC: 8.9 MG/DL (ref 8.3–10.4)
CHLORIDE SERPL-SCNC: 102 MMOL/L (ref 98–107)
CO2 SERPL-SCNC: 30 MMOL/L (ref 21–32)
CREAT SERPL-MCNC: 0.81 MG/DL (ref 0.6–1)
DIFFERENTIAL METHOD BLD: ABNORMAL
EOSINOPHIL # BLD: 0.1 K/UL (ref 0–0.8)
EOSINOPHIL NFR BLD: 1 % (ref 0.5–7.8)
ERYTHROCYTE [DISTWIDTH] IN BLOOD BY AUTOMATED COUNT: 12 % (ref 11.9–14.6)
GLOBULIN SER CALC-MCNC: 3.9 G/DL (ref 2.3–3.5)
GLUCOSE SERPL-MCNC: 86 MG/DL (ref 65–100)
HCT VFR BLD AUTO: 44.8 % (ref 35.8–46.3)
HGB BLD-MCNC: 15.4 G/DL (ref 11.7–15.4)
IMM GRANULOCYTES # BLD: 0 K/UL (ref 0–0.5)
IMM GRANULOCYTES NFR BLD AUTO: 0.1 % (ref 0–5)
LIPASE SERPL-CCNC: 290 U/L (ref 73–393)
LYMPHOCYTES # BLD AUTO: 41 % (ref 13–44)
LYMPHOCYTES # BLD: 3.1 K/UL (ref 0.5–4.6)
MCH RBC QN AUTO: 32.2 PG (ref 26.1–32.9)
MCHC RBC AUTO-ENTMCNC: 34.4 G/DL (ref 31.4–35)
MCV RBC AUTO: 93.5 FL (ref 79.6–97.8)
MONOCYTES # BLD: 0.3 K/UL (ref 0.1–1.3)
MONOCYTES NFR BLD AUTO: 4 % (ref 4–12)
NEUTS SEG # BLD: 3.9 K/UL (ref 1.7–8.2)
NEUTS SEG NFR BLD AUTO: 53 % (ref 43–78)
PLATELET # BLD AUTO: 273 K/UL (ref 150–450)
PMV BLD AUTO: 10.1 FL (ref 10.8–14.1)
POTASSIUM SERPL-SCNC: 3.6 MMOL/L (ref 3.5–5.1)
PROT SERPL-MCNC: 7.7 G/DL (ref 6.3–8.2)
RBC # BLD AUTO: 4.79 M/UL (ref 4.05–5.25)
SODIUM SERPL-SCNC: 140 MMOL/L (ref 136–145)
WBC # BLD AUTO: 7.4 K/UL (ref 4.3–11.1)

## 2017-03-26 PROCEDURE — 85025 COMPLETE CBC W/AUTO DIFF WBC: CPT | Performed by: EMERGENCY MEDICINE

## 2017-03-26 PROCEDURE — 80053 COMPREHEN METABOLIC PANEL: CPT | Performed by: EMERGENCY MEDICINE

## 2017-03-26 PROCEDURE — 74022 RADEX COMPL AQT ABD SERIES: CPT

## 2017-03-26 PROCEDURE — 99284 EMERGENCY DEPT VISIT MOD MDM: CPT

## 2017-03-26 PROCEDURE — 81003 URINALYSIS AUTO W/O SCOPE: CPT

## 2017-03-26 PROCEDURE — 83690 ASSAY OF LIPASE: CPT | Performed by: EMERGENCY MEDICINE

## 2017-03-27 ENCOUNTER — APPOINTMENT (OUTPATIENT)
Dept: CT IMAGING | Age: 47
End: 2017-03-27
Payer: COMMERCIAL

## 2017-03-27 ENCOUNTER — HOSPITAL ENCOUNTER (OUTPATIENT)
Dept: PHYSICAL THERAPY | Age: 47
Discharge: HOME | End: 2017-03-27
Payer: COMMERCIAL

## 2017-03-27 VITALS
OXYGEN SATURATION: 96 % | HEIGHT: 60 IN | WEIGHT: 133.5 LBS | TEMPERATURE: 98.6 F | RESPIRATION RATE: 14 BRPM | HEART RATE: 80 BPM | DIASTOLIC BLOOD PRESSURE: 87 MMHG | BODY MASS INDEX: 26.21 KG/M2 | SYSTOLIC BLOOD PRESSURE: 132 MMHG

## 2017-03-27 PROCEDURE — 97140 MANUAL THERAPY 1/> REGIONS: CPT

## 2017-03-27 PROCEDURE — 74176 CT ABD & PELVIS W/O CONTRAST: CPT

## 2017-03-27 PROCEDURE — 97110 THERAPEUTIC EXERCISES: CPT

## 2017-03-27 PROCEDURE — 74011636320 HC RX REV CODE- 636/320

## 2017-03-27 RX ORDER — POLYETHYLENE GLYCOL 3350 17 G/17G
17 POWDER, FOR SOLUTION ORAL DAILY
Qty: 595 G | Refills: 0 | Status: SHIPPED | OUTPATIENT
Start: 2017-03-27 | End: 2018-04-23

## 2017-03-27 RX ADMIN — DIATRIZOATE MEGLUMINE AND DIATRIZOATE SODIUM 15 ML: 660; 100 LIQUID ORAL; RECTAL at 03:49

## 2017-03-27 NOTE — ED PROVIDER NOTES
HPI Comments: 53-year-old female complaining of generalized abdominal pain and bloating. Patient states this is gone on for several days but got worse tonight. No diarrhea, no fever, bowel movement yesterday, not today. Review of medical records show she was recently at [facility] for the same complaint. Patient is a 48 y.o. female presenting with abdominal pain. The history is provided by the patient. Abdominal Pain    This is a recurrent problem. The current episode started more than 2 days ago. The problem occurs constantly. The problem has been gradually worsening. The pain is associated with an unknown factor. The pain is located in the generalized abdominal region. The quality of the pain is cramping. The pain is at a severity of 1/10. Associated symptoms include nausea. Pertinent negatives include no diarrhea, no hematochezia, no melena, no constipation and no dysuria.         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       Past Surgical History:   Procedure Laterality Date    ABDOMEN SURGERY PROC UNLISTED      hernia    HX PARTIAL HYSTERECTOMY  2007    due to cancer    HX TOTAL ABDOMINAL HYSTERECTOMY      \"adenomyosis per pt\"         Family History:   Problem Relation Age of Onset    Heart Surgery Mother      CABG    Heart Attack Mother     Other Mother      Carotid Artery Stenosis     Elevated Lipids Mother     Hypertension Mother     Cancer Mother      Cervical Cancer    Heart Disease Mother     Alcohol abuse Father     COPD Sister     Other Brother      Peripheral Vascular Disease    Elevated Lipids Maternal Grandmother     Hypertension Maternal Grandmother     Elevated Lipids Maternal Grandfather     Hypertension Maternal Grandfather        Social History     Social History    Marital status: SINGLE     Spouse name: N/A    Number of children: N/A    Years of education: N/A     Occupational History    Not on file. Social History Main Topics    Smoking status: Current Some Day Smoker    Smokeless tobacco: Not on file      Comment: sts smokes 1 pack per month    Alcohol use Yes      Comment: 3-4 x's per year    Drug use: No    Sexual activity: Not on file     Other Topics Concern    Not on file     Social History Narrative         ALLERGIES: Iodine    Review of Systems   Constitutional: Negative. Negative for activity change. HENT: Negative. Eyes: Negative. Respiratory: Negative. Cardiovascular: Negative. Gastrointestinal: Positive for abdominal pain and nausea. Negative for constipation, diarrhea, hematochezia and melena. Genitourinary: Negative. Negative for dysuria. Musculoskeletal: Negative. Skin: Negative. Neurological: Negative. Psychiatric/Behavioral: Negative. All other systems reviewed and are negative. Vitals:    03/26/17 2039 03/27/17 0100 03/27/17 0209 03/27/17 0309   BP: (!) 145/99 148/89 133/67 130/74   Pulse: 86   80   Resp: 15   14   Temp: 98.2 °F (36.8 °C)   98.6 °F (37 °C)   Weight: 60.6 kg (133 lb 8 oz)      Height: 5' (1.524 m)               Physical Exam   Constitutional: She is oriented to person, place, and time. She appears well-developed and well-nourished. No distress. HENT:   Head: Normocephalic and atraumatic. Right Ear: External ear normal.   Left Ear: External ear normal.   Nose: Nose normal.   Mouth/Throat: Oropharynx is clear and moist. No oropharyngeal exudate. Eyes: Conjunctivae and EOM are normal. Pupils are equal, round, and reactive to light. Right eye exhibits no discharge. Left eye exhibits no discharge. No scleral icterus. Neck: Normal range of motion. Neck supple. No JVD present. No tracheal deviation present. Cardiovascular: Normal rate, regular rhythm and intact distal pulses.     Pulmonary/Chest: Effort normal and breath sounds normal. No stridor. No respiratory distress. She has no wheezes. She exhibits no tenderness. Abdominal: Soft. Bowel sounds are normal. She exhibits no distension and no mass. There is tenderness in the right upper quadrant, right lower quadrant and periumbilical area. There is no rigidity, no rebound, no guarding, no tenderness at McBurney's point and negative Nash's sign. Musculoskeletal: Normal range of motion. She exhibits no edema or tenderness. Neurological: She is alert and oriented to person, place, and time. No cranial nerve deficit. Skin: Skin is warm and dry. No rash noted. She is not diaphoretic. No erythema. No pallor. Psychiatric: She has a normal mood and affect. Her behavior is normal. Thought content normal.   Nursing note and vitals reviewed. MDM  Number of Diagnoses or Management Options  Diagnosis management comments: No acute abdominal findings. CT abdomen normal.  Refer patient back to her primary care physician.        Amount and/or Complexity of Data Reviewed  Clinical lab tests: ordered and reviewed  Tests in the medicine section of CPT®: ordered and reviewed    Risk of Complications, Morbidity, and/or Mortality  Presenting problems: moderate  Diagnostic procedures: moderate  Management options: moderate    Patient Progress  Patient progress: stable    ED Course       Procedures

## 2017-03-27 NOTE — DISCHARGE INSTRUCTIONS

## 2017-03-27 NOTE — PROGRESS NOTES
Yesi Munoz  : 1970 [facility] [address], Jeremiah adams, [address]  [phone]   [phone]         OUTPATIENT PHYSICAL THERAPY:Daily Note 3/27/2017    ICD-10: Treatment Diagnosis: M25.571 pain in joint/ankle /joints of foot and R26.89 other abnormalities of gait and mobility  Precautions/Allergies:   Iodine-excessive weight bearing   Fall Risk Score: 1 (? 5 = High Risk)  MD Orders: evaluate and treat MEDICAL/REFERRING DIAGNOSIS:  Right ankle pain [M25.571]   DATE OF ONSET: 11-10-16  REFERRING PHYSICIAN: Daniel Ruiz DPM  RETURN PHYSICIAN APPOINTMENT: 17     INITIAL ASSESSMENT:  Ms. 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 Decreased Strength  2. Decreased ADL/Functional Activities  3. Decreased Transfer Abilities  4. Decreased Ambulation Ability/Technique  5. Decreased Balance  6. Increased Pain  7. Decreased Activity Tolerance  8. Decreased Pacing Skills INTERVENTIONS PLANNED:  1. Cryotherapy  2.  Gait Training  3. Heat  4. Home Exercise Program (HEP)  5. Manual Therapy  6. Therapeutic Exercise/Strengthening   7. vasopneumatic compression with cold   TREATMENT PLAN:  Effective Dates: 2-15-17 to 3-30-17. Frequency/Duration: 2 times a week for 6 weeks  GOALS: (Goals have been discussed and agreed upon with patient.)  Short-Term Functional Goals: Time Frame: 3-1-17  1. Lateral R ankle pain is less than 2/10 at rest and less than 7/10 after working all day to progress to DC goal -GOAL MET  Instruction in exercise program to allow increased ADLs. -GOAL MET  Discharge Goals: Time Frame:3-30-17  1. No ankle pain at rest and 2-3/10 after working/standing for 8 hours to allow her to perform full home ADLs and most job duties -GOAL MET-PAIN LEVEL IS 0/10  2. Full /painfree ankle mobility to allow ease with putting on shoes and socks and in and out of car and driving/hitting gas and brake pedal without any problem/pain-GOAL MET  3. Independent ambulation with no antalgic gait to allow walking community distances-GOAL MET  4. Able to go up and down curbs with only minimal issue -ONGOING  5. FAAM score is improved from 34/116 to 55/116-GOAL MET-LATEST SCORE IS 87/116    Patient has been seen for 6 visits of R ankle rehab with good progress-pain level is as low as 0/10 with minimal antalgic gait -independent with home exercise program -minimal swelling in R lateral ankle at this time -FAAM score has improved from 34/116 to 87/116-motivated patient -pleasant lady to work with -continue current regimen at this time     Rehabilitation Potential For Stated Goals: Good  Regarding Chanda Ziegler's therapy, I certify that the treatment plan above will be carried out by a therapist or under their direction.   Thank you for this referral,  Veronica Alarcon PTA     Referring Physician Signature: Faith Chakraborty DPM              Date                    The information in this section was collected on 2-15-17 (except where otherwise noted). HISTORY:   History of Present Injury/Illness (Reason for Referral):  R inversion ankle sprain on 11-10-16 while jumping/playing with granddaughter  Past Medical History/Comorbidities:   Ms. Willy Jorgensen  has a past medical history of Bone spur of toe (1/17/2017); Fibromyalgia muscle pain (1/17/2017); Hyperlipidemia (1/17/2017); Migraine headache (1/17/2017); and Vaginal leukoplakia (1/17/2017). Ms. Willy Jorgensen  has a past surgical history that includes abdomen surgery proc unlisted; partial hysterectomy (2007); and total abdominal hysterectomy. Social History/Living Environment:      Prior Level of Function/Work/Activity:  Independent with home ADLs and job duties and walking with no antalgic gait     Current Medications:       Current Outpatient Prescriptions:     polyethylene glycol (MIRALAX) 17 gram/dose powder, Take 17 g by mouth daily. 1 tablespoon with 8 oz of water daily, Disp: 595 g, Rfl: 0    Naproxen Sodium 500 mg TM24, 500 mg daily. , Disp: , Rfl:     ibuprofen 200 mg cap, 200 mg. 2 oral every 6 hours, Disp: , Rfl:     traMADol (ULTRAM) 50 mg tablet, Take 1 Tab by mouth every six (6) hours as needed for Pain., Disp: 20 Tab, Rfl: 0  No current facility-administered medications for this encounter.     Date Last Reviewed: 3-22-17   Number of Personal Factors/Comorbidities that affect the Plan of Care-patient has a history of lumbar/arthritis issues and fibromyalgia and is raising her grandchild alone and may have difficulty finding time to come to therapy  : 1-2: MODERATE COMPLEXITY   EXAMINATION:   Palpation:         Minimal tender at R anterior talo fibular ligament and distal peroneal tendon with tight gastrosoleus  ROM:         Ankle range is wfl but guarded on the R -    Circumference at malleoli is 9.0 on L and 9.15 on R -minimal swelling  Strength:          wfl except R ankle evertors are  3+ to 4-/5 but with minimal pain inhibition  Special Tests:         Negative homans sign -good pedal pulses-skin is warm not hot   Balance: Moderate antalgic gait with walking independently--attempted walking with cane but patient will need practice with this   Mental Status:         Alert and oriented and patient wants to take some time off from work to get this ankle well   Sensation:       Intact to light touch    Body Structures Involved:  1. Bones  2. Joints  3. Muscles  4. Ligaments Body Functions Affected:  1. Sensory/Pain  2. Neuromusculoskeletal  3. Movement Related Activities and Participation Affected:  1. Learning and Applying Knowledge  2. General Tasks and Demands  3. Mobility  4. Self Care  5. Community, Social and Garfield Vaughn   Number of elements (examined above) that affect the Plan of Care: 3: MODERATE COMPLEXITY   CLINICAL PRESENTATION:   Presentation: Evolving clinical presentation with changing clinical characteristics: MODERATE COMPLEXITY   CLINICAL DECISION MAKING:   Outcome Measure: Tool Used: PT/OT FOOT AND ANKLE ABILITY MEASURE  Score:  Initial: 34/116 Most Recent: 87/116 (Date: 3-7-17-- )   Interpretation of Score: For the \"Activities of Daily Living\", there are 21 questions each scored on a 5 point scale with 0 representing \"Unable to do\" and 4 representing \"No difficulty\". The lower the score, the greater the functional disability. 84/84 represents no disability. Minimal detectable change is 5.7 points. With the addition of the 8 questions in the \"Sports Subscale,\" there are 29 questions, each scored on a 5 point scale with 0 representing \"Unable to do\" and 4 representing \"No difficulty\". The lower the score, the greater the functional disability. 116/116 represents no disability. Minimal detectable change is 12.3 points.     Activities of Daily Living:  Score 84 83-68 67-51 50-34 33-18 17-1 0   Modifier CH CI CJ CK CL CM CN     Activities of Daily Living + Sports Subscale:  Score 116 115-94 93-71 70-47 46-24 23-1 0   Modifier CH CI CJ CK CL CM CN         Medical Necessity: · Patient is expected to demonstrate progress in strength, range of motion, balance, coordination and functional technique to increase independence with ADLs and improve safety during walking and transfers. · Skilled intervention continues to be required due to R ankle pain/sprain is affecting function and gait. Reason for Services/Other Comments:  · Patient continues to require modification of therapeutic interventions to increase complexity of exercises. Use of outcome tool(s) and clinical judgement create a POC that gives a: Questionable prediction of patient's progress: MODERATE COMPLEXITY            TREATMENT:   (In addition to Assessment/Re-Assessment sessions the following treatments were rendered)  Pre-treatment Symptoms/Complaints: Patient reports minimal pain in ankle today. Patient states she was in emergency room until 6 am this morning due to abdominal discomfort. Pt has not slept. Pain: Initial:   Pain Intensity 1: 1  Pain Location 1: Ankle  Pain Orientation 1: Right  Post Session: 0-1/10 after session -mild lateral ankle soreness -good progress with increased ankle stability-tolerated increased exercise work load     THERAPEUTIC EXERCISE: (30 minutes): decreased exercise volume due to pt generally not feeling well      Exercises per grid below to improve mobility, strength, balance and coordination. Required minimal verbal cues to promote proper body alignment and promote proper body posture. Progressed resistance, range, repetitions and complexity of movement as indicated.    Date:  3-27-17 Date:  3-15-17 Date:  3-22-17   Activity/Exercise Parameters Parameters Parameters   gastrocsoleus stretch    Incline 4 x 30 sec X 3 minutes on incline     X 2 minutes by therapist between exercises X 3 minutes on incline board and 2 minutes by therapist between exercise sets   Ankle eversion Green 3 x 15 10 x 20 reps with red band  6x 20 with green band   Ankle inversion Green 3 x 15 2 x 20 reps with red band     dorsiflexion Green 3 x 15 2 x 20 reps with red band     Plantar flexion Green 3 x 15 2 x 20 reps with red band     Mini squats      Weight shifts      NU step  12 minutes level 3 X 12 minutes at level 4    Single leg stance 6 x 15 sec sec black therapad Green therapad 6 x 20 seconds X 8 with 30 second hold  on blue therapad and x 8 on BOSU    Heel raises 2 x 15     aridyne 8 minutes     elliptical                           MANUAL THERAPY - soft tissue mobilization to lateral ankle ,peroneal musculature and gastrocsoleus x 15 minutes while supine       Treatment/Session Assessment:    · Response to Treatment: minimal ankle pain at this time. No antalgic noted today. · Compliance with Program/Exercises: compliant most of the time. · Recommendations/Intent for next treatment session: \"Next visit will focus on advancements to more challenging activities\"progress weight bearing exercises as tolerated. -    ·   Total Treatment Duration:45 minutes      PT Patient Time In/Time Out  Time In: 1000  Time Out: [facility]

## 2017-03-29 ENCOUNTER — HOSPITAL ENCOUNTER (OUTPATIENT)
Dept: PHYSICAL THERAPY | Age: 47
Discharge: HOME | End: 2017-03-29
Payer: COMMERCIAL

## 2017-03-29 PROCEDURE — 97110 THERAPEUTIC EXERCISES: CPT

## 2017-03-29 NOTE — PROGRESS NOTES
Brinton Boast  : 1970 [facility] [address], [place], [address] [place] [address]  [phone]   [phone]         OUTPATIENT PHYSICAL THERAPY:Daily Note and Discharge 3/29/2017    ICD-10: Treatment Diagnosis: M25.571 pain in joint/ankle /joints of foot and R26.89 other abnormalities of gait and mobility  Precautions/Allergies:   Iodine-excessive weight bearing   Fall Risk Score: 1 (? 5 = High Risk)  MD Orders: evaluate and treat MEDICAL/REFERRING DIAGNOSIS:  Right ankle pain [M25.571]   DATE OF ONSET: 11-10-16  REFERRING PHYSICIAN: Ree Ken DPM  RETURN PHYSICIAN APPOINTMENT: 17     INITIAL ASSESSMENT:  Ms. 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 Decreased Strength  2. Decreased ADL/Functional Activities  3. Decreased Transfer Abilities  4. Decreased Ambulation Ability/Technique  5. Decreased Balance  6. Increased Pain  7. Decreased Activity Tolerance  8.  Decreased Pacing Skills INTERVENTIONS PLANNED:  1. Cryotherapy  2. Gait Training  3. Heat  4. Home Exercise Program (HEP)  5. Manual Therapy  6. Therapeutic Exercise/Strengthening   7. vasopneumatic compression with cold   TREATMENT PLAN:  Effective Dates: 2-15-17 to 3-30-17. Frequency/Duration: 2 times a week for 6 weeks  GOALS: (Goals have been discussed and agreed upon with patient.)  Short-Term Functional Goals: Time Frame: 3-1-17  1. Lateral R ankle pain is less than 2/10 at rest and less than 7/10 after working all day to progress to DC goal -GOAL MET  Instruction in exercise program to allow increased ADLs. -GOAL MET  Discharge Goals: Time Frame:3-30-17  1. No ankle pain at rest and 2-3/10 after working/standing for 8 hours to allow her to perform full home ADLs and most job duties -GOAL MET-PAIN LEVEL IS 0/10  2. Full /painfree ankle mobility to allow ease with putting on shoes and socks and in and out of car and driving/hitting gas and brake pedal without any problem/pain-GOAL MET  3. Independent ambulation with no antalgic gait to allow walking community distances-GOAL MET  4. Able to go up and down curbs with only minimal issue -GOAL MET  5. FAAM score is improved from 34/116 to 55/116-GOAL MET-LATEST SCORE /116    Patient has been seen for 12 visits of R ankle rehab with very good progress from 2-15-17 to 3-29-17-pain level is as low as 0/10 -independent ambulation with no antalgic gait  -independent with home exercise program -full ankle range of motion with no swelling -goals met--FAAM score has improved from 34/116 to 87/116 to 102/116-motivated patient -pleasant lady to work with -DC to home exercise program and ice as needed     Rehabilitation Potential For Stated Goals: Good  Regarding Teo Ziegler's therapy, I certify that the treatment plan above will be carried out by a therapist or under their direction.   Thank you for this referral,  Ryan Gallegos PT     Referring Physician Signature: Uriah Chakraborty DPM Date                    The information in this section was collected on 2-15-17 (except where otherwise noted). HISTORY:   History of Present Injury/Illness (Reason for Referral):  R inversion ankle sprain on 11-10-16 while jumping/playing with granddaughter  Past Medical History/Comorbidities:   Ms. 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 Ms. Laya Alberto  has a past surgical history that includes abdomen surgery proc unlisted; partial hysterectomy (2007); and total abdominal hysterectomy. Social History/Living Environment:      Prior Level of Function/Work/Activity:  Independent with home ADLs and job duties and walking with no antalgic gait     Current Medications:       Current Outpatient Prescriptions:     polyethylene glycol (MIRALAX) 17 gram/dose powder, Take 17 g by mouth daily. 1 tablespoon with 8 oz of water daily, Disp: 595 g, Rfl: 0    Naproxen Sodium 500 mg TM24, 500 mg daily. , Disp: , Rfl:     ibuprofen 200 mg cap, 200 mg. 2 oral every 6 hours, Disp: , Rfl:     traMADol (ULTRAM) 50 mg tablet, Take 1 Tab by mouth every six (6) hours as needed for Pain., Disp: 20 Tab, Rfl: 0   Date Last Reviewed: 3-29-17   Number of Personal Factors/Comorbidities that affect the Plan of Care-patient has a history of lumbar/arthritis issues and fibromyalgia and is raising her grandchild alone and may have difficulty finding time to come to therapy  : 1-2: MODERATE COMPLEXITY   EXAMINATION:   Palpation:         Minimal to no   Tenderness  at R anterior talo fibular ligament and distal peroneal tendon with tight gastrosoleus  ROM:         Ankle range is wfl-    Circumference at malleoli is 9.0 on L and 9.1 on R -minimal to no  swelling  Strength:          wfl -4 to 4+/5 with evertors at 4- to 4 /5    Special Tests:         Negative homans sign -good pedal pulses-skin is warm not hot   Balance:         Independent ambulation with no antalgic gait    Mental Status:         Alert and oriented and patient wants to take some time off from work to get this ankle well   Sensation:       Intact to light touch    Body Structures Involved:  1. Bones  2. Joints  3. Muscles  4. Ligaments Body Functions Affected:  1. Sensory/Pain  2. Neuromusculoskeletal  3. Movement Related Activities and Participation Affected:  1. Learning and Applying Knowledge  2. General Tasks and Demands  3. Mobility  4. Self Care  5. Community, Social and [place]   Number of elements (examined above) that affect the Plan of Care: 3: MODERATE COMPLEXITY   CLINICAL PRESENTATION:   Presentation: Evolving clinical presentation with changing clinical characteristics: MODERATE COMPLEXITY   CLINICAL DECISION MAKING:   Outcome Measure: Tool Used: PT/OT FOOT AND ANKLE ABILITY MEASURE  Score:  Initial: 34/116 Most Recent: 102/116 (Date: 3-29-17-- )   Interpretation of Score: For the \"Activities of Daily Living\", there are 21 questions each scored on a 5 point scale with 0 representing \"Unable to do\" and 4 representing \"No difficulty\". The lower the score, the greater the functional disability. 84/84 represents no disability. Minimal detectable change is 5.7 points. With the addition of the 8 questions in the \"Sports Subscale,\" there are 29 questions, each scored on a 5 point scale with 0 representing \"Unable to do\" and 4 representing \"No difficulty\". The lower the score, the greater the functional disability. 116/116 represents no disability. Minimal detectable change is 12.3 points.     Activities of Daily Living:  Score 84 83-68 67-51 50-34 33-18 17-1 0   Modifier CH CI CJ CK CL CM CN     Activities of Daily Living + Sports Subscale:  Score 116 115-94 93-71 70-47 46-24 23-1 0   Modifier CH CI CJ CK CL CM CN         Medical Necessity:   · Patient is expected to demonstrate progress in strength, range of motion, balance, coordination and functional technique to increase independence with ADLs and improve safety during walking and transfers. · Skilled intervention continues to be required due to R ankle pain/sprain is affecting function and gait. Reason for Services/Other Comments:  · Patient continues to require modification of therapeutic interventions to increase complexity of exercises. Use of outcome tool(s) and clinical judgement create a POC that gives a: Questionable prediction of patient's progress: MODERATE COMPLEXITY            TREATMENT:   (In addition to Assessment/Re-Assessment sessions the following treatments were rendered)  Pre-treatment Symptoms/Complaints: Patient reports minimal pain in ankle today. Patient states she was in emergency room until 6 am this morning due to abdominal discomfort. Pt has not slept. Pain: Initial:   Pain Intensity 1: 0  Pain Location 1: Ankle  Pain Orientation 1: Lateral, Right  Pain Intervention(s) 1: Cold pack, Exercise  Post Session: 0/10 after session -mild lateral ankle soreness after working/standing all day-advised ankle support an dice with working/work breaks  -good progress with increased ankle stability-tolerated increased exercise work load-DC to home program     THERAPEUTIC EXERCISE: (40 minutes):       Exercises per grid below to improve mobility, strength, balance and coordination. Required minimal verbal cues to promote proper body alignment and promote proper body posture. Progressed resistance, range, repetitions and complexity of movement as indicated.    Date:  3-27-17 Date:  3-29-17 Date:  3-22-17   Activity/Exercise Parameters Parameters Parameters   gastrocsoleus stretch    Incline 4 x 30 sec X 3 minutes on incline      X 3 minutes on incline board and 2 minutes by therapist between exercise sets   Ankle eversion Green 3 x 15 5 x 20 reps with red band  6x 20 with green band   Ankle inversion Green 3 x 15 3 x 20 reps with red band dorsiflexion Green 3 x 15 3 x 20 reps with red band     Plantar flexion Green 3 x 15 3 x 20 reps with red band     Mini squats      Weight shifts      NU step  15 minutes level 5 X 12 minutes at level 4    Single leg stance 6 x 15 sec sec black therapad blue therapad 6 x 20 seconds X 8 with 30 second hold  on blue therapad and x 8 on BOSU    Heel raises 2 x 15     aridyne 8 minutes     elliptical                           MANUAL THERAPY -     vasopneumatic compression with cold to R ankle x 15 minutes while supine       NO CHARGE FOR VASOPNEUMATIC COMPRESSION       Treatment/Session Assessment:    · Response to Treatment: minimal to no ankle complaints -DC to home program .   · Compliance with Program/Exercises: compliant most of the time.   · Recommendations/Intent for next treatment session: \"DC to home program after 12 visits of therapy -goals met-motivated patient who made very good progress    ·   Total Treatment Duration:55 minutes      PT Patient Time In/Time Out  Time In: 1000  Time Out: [address], PT

## 2018-04-23 PROBLEM — Z87.19 HISTORY OF GASTRITIS: Status: ACTIVE | Noted: 2018-04-23

## 2018-04-23 PROBLEM — K76.89 LIVER CYST: Status: ACTIVE | Noted: 2018-04-23

## 2018-04-23 PROBLEM — M50.30 DDD (DEGENERATIVE DISC DISEASE), CERVICAL: Status: ACTIVE | Noted: 2018-04-23

## 2018-05-12 ENCOUNTER — HOSPITAL ENCOUNTER (OUTPATIENT)
Dept: MAMMOGRAPHY | Age: 48
Discharge: HOME | End: 2018-05-12
Attending: INTERNAL MEDICINE
Payer: COMMERCIAL

## 2018-05-12 DIAGNOSIS — Z12.39 SCREENING FOR MALIGNANT NEOPLASM OF BREAST: ICD-10-CM

## 2018-05-12 PROCEDURE — 77067 SCR MAMMO BI INCL CAD: CPT

## 2018-05-17 ENCOUNTER — HOSPITAL ENCOUNTER (OUTPATIENT)
Dept: LAB | Age: 48
Discharge: HOME | End: 2018-05-17

## 2018-05-17 PROCEDURE — 88305 TISSUE EXAM BY PATHOLOGIST: CPT | Performed by: INTERNAL MEDICINE

## 2018-07-13 PROBLEM — E55.9 VITAMIN D DEFICIENCY: Status: ACTIVE | Noted: 2018-07-13

## 2018-10-23 PROBLEM — M67.471 GANGLION CYST OF RIGHT FOOT: Status: ACTIVE | Noted: 2018-10-23

## 2018-11-12 ENCOUNTER — HOSPITAL ENCOUNTER (OUTPATIENT)
Dept: ULTRASOUND IMAGING | Age: 48
Discharge: HOME | End: 2018-11-12
Attending: INTERNAL MEDICINE
Payer: COMMERCIAL

## 2018-11-12 DIAGNOSIS — K76.89 LIVER CYST: ICD-10-CM

## 2018-11-12 PROCEDURE — 76700 US EXAM ABDOM COMPLETE: CPT

## 2020-03-24 PROBLEM — J06.9 UPPER RESPIRATORY TRACT INFECTION: Status: ACTIVE | Noted: 2020-03-24
